# Patient Record
Sex: FEMALE | Race: WHITE | Employment: FULL TIME | URBAN - METROPOLITAN AREA
[De-identification: names, ages, dates, MRNs, and addresses within clinical notes are randomized per-mention and may not be internally consistent; named-entity substitution may affect disease eponyms.]

---

## 2017-07-26 ENCOUNTER — TELEPHONE (OUTPATIENT)
Dept: FAMILY MEDICINE CLINIC | Age: 52
End: 2017-07-26

## 2017-08-02 ENCOUNTER — TELEPHONE (OUTPATIENT)
Dept: FAMILY MEDICINE CLINIC | Age: 52
End: 2017-08-02

## 2017-09-05 ENCOUNTER — OFFICE VISIT (OUTPATIENT)
Dept: FAMILY MEDICINE CLINIC | Age: 52
End: 2017-09-05
Payer: COMMERCIAL

## 2017-09-05 VITALS
WEIGHT: 159.2 LBS | SYSTOLIC BLOOD PRESSURE: 116 MMHG | RESPIRATION RATE: 16 BRPM | BODY MASS INDEX: 24.13 KG/M2 | DIASTOLIC BLOOD PRESSURE: 72 MMHG | TEMPERATURE: 97.1 F | HEIGHT: 68 IN | HEART RATE: 62 BPM

## 2017-09-05 DIAGNOSIS — Z00.00 ENCOUNTER FOR ANNUAL HEALTH EXAMINATION: Primary | ICD-10-CM

## 2017-09-05 LAB
ALBUMIN SERPL-MCNC: 4.6 G/DL (ref 3.5–5.1)
ALP BLD-CCNC: 66 U/L (ref 38–126)
ALT SERPL-CCNC: 25 U/L (ref 11–66)
AMYLASE: 67 U/L (ref 20–104)
ANION GAP SERPL CALCULATED.3IONS-SCNC: 12 MEQ/L (ref 8–16)
AST SERPL-CCNC: 25 U/L (ref 5–40)
AVERAGE GLUCOSE: 105 MG/DL (ref 70–126)
BASOPHILS # BLD: 0.8 %
BASOPHILS ABSOLUTE: 0 THOU/MM3 (ref 0–0.1)
BILIRUB SERPL-MCNC: 0.6 MG/DL (ref 0.3–1.2)
BILIRUBIN DIRECT: < 0.2 MG/DL (ref 0–0.3)
BUN BLDV-MCNC: 16 MG/DL (ref 7–22)
CALCIUM SERPL-MCNC: 10.2 MG/DL (ref 8.5–10.5)
CHLORIDE BLD-SCNC: 100 MEQ/L (ref 98–111)
CHOLESTEROL, TOTAL: 210 MG/DL (ref 100–199)
CO2: 30 MEQ/L (ref 23–33)
CREAT SERPL-MCNC: 0.7 MG/DL (ref 0.4–1.2)
EOSINOPHIL # BLD: 3.9 %
EOSINOPHILS ABSOLUTE: 0.2 THOU/MM3 (ref 0–0.4)
ESTRADIOL LEVEL: 6.2 PG/ML
FOLLICLE STIMULATING HORMONE: 109.1 MIU/ML (ref 16–160)
GFR SERPL CREATININE-BSD FRML MDRD: 87 ML/MIN/1.73M2
GLUCOSE BLD-MCNC: 101 MG/DL (ref 70–108)
HBA1C MFR BLD: 5.5 % (ref 4.4–6.4)
HCT VFR BLD CALC: 45.5 % (ref 37–47)
HDLC SERPL-MCNC: 74 MG/DL
HEMOGLOBIN: 15.4 GM/DL (ref 12–16)
HEPATITIS C ANTIBODY: NEGATIVE
LDL CHOLESTEROL CALCULATED: 122 MG/DL
LIPASE: 43.8 U/L (ref 5.6–51.3)
LUTEINIZING HORMONE: 57.1 MIU/ML (ref 3.3–70.6)
LYMPHOCYTES # BLD: 43.5 %
LYMPHOCYTES ABSOLUTE: 2.3 THOU/MM3 (ref 1–4.8)
MAGNESIUM: 2.1 MG/DL (ref 1.6–2.4)
MCH RBC QN AUTO: 31.3 PG (ref 27–31)
MCHC RBC AUTO-ENTMCNC: 33.8 GM/DL (ref 33–37)
MCV RBC AUTO: 92.6 FL (ref 81–99)
MONOCYTES # BLD: 6.3 %
MONOCYTES ABSOLUTE: 0.3 THOU/MM3 (ref 0.4–1.3)
NUCLEATED RED BLOOD CELLS: 0 /100 WBC
PDW BLD-RTO: 13.6 % (ref 11.5–14.5)
PLATELET # BLD: 231 THOU/MM3 (ref 130–400)
PMV BLD AUTO: 8.1 MCM (ref 7.4–10.4)
POTASSIUM SERPL-SCNC: 4 MEQ/L (ref 3.5–5.2)
PROGESTERONE LEVEL: < 0.05 NG/ML
PTH INTACT: 26.2 PG/ML (ref 15–65)
RBC # BLD: 4.92 MILL/MM3 (ref 4.2–5.4)
RBC # BLD: NORMAL 10*6/UL
RHEUMATOID FACTOR: < 10 IU/ML (ref 0–13)
SEDIMENTATION RATE, ERYTHROCYTE: 7 MM/HR (ref 0–20)
SEG NEUTROPHILS: 45.5 %
SEGMENTED NEUTROPHILS ABSOLUTE COUNT: 2.5 THOU/MM3 (ref 1.8–7.7)
SODIUM BLD-SCNC: 142 MEQ/L (ref 135–145)
T3 TOTAL: 110 NG/DL (ref 72–181)
TOTAL PROTEIN: 6.8 G/DL (ref 6.1–8)
TRIGL SERPL-MCNC: 68 MG/DL (ref 0–199)
TSH SERPL DL<=0.05 MIU/L-ACNC: 1.29 UIU/ML (ref 0.4–4.2)
URIC ACID: 6 MG/DL (ref 2.4–5.7)
VITAMIN D 25-HYDROXY: 78 NG/ML (ref 30–100)
WBC # BLD: 5.4 THOU/MM3 (ref 4.8–10.8)

## 2017-09-05 PROCEDURE — 36415 COLL VENOUS BLD VENIPUNCTURE: CPT | Performed by: FAMILY MEDICINE

## 2017-09-05 PROCEDURE — 99396 PREV VISIT EST AGE 40-64: CPT | Performed by: FAMILY MEDICINE

## 2017-09-05 RX ORDER — CHLORHEXIDINE/GLYCERIN/HE-CELL
3 JELLY (GRAM) TOPICAL 4 TIMES DAILY
COMMUNITY

## 2017-09-05 ASSESSMENT — ENCOUNTER SYMPTOMS
EYES NEGATIVE: 1
ALLERGIC/IMMUNOLOGIC NEGATIVE: 1
GASTROINTESTINAL NEGATIVE: 1
RESPIRATORY NEGATIVE: 1

## 2017-09-05 ASSESSMENT — PATIENT HEALTH QUESTIONNAIRE - PHQ9
SUM OF ALL RESPONSES TO PHQ9 QUESTIONS 1 & 2: 0
SUM OF ALL RESPONSES TO PHQ QUESTIONS 1-9: 0
1. LITTLE INTEREST OR PLEASURE IN DOING THINGS: 0
2. FEELING DOWN, DEPRESSED OR HOPELESS: 0

## 2017-09-06 LAB — THYROXINE (T4): 5.7 UG/DL (ref 4.5–12)

## 2017-09-07 LAB
ANA SCREEN: DETECTED
C-REACTIVE PROTEIN, HIGH SENSITIVITY: 0.4 MG/L
DHEAS (DHEA SULFATE): 214 UG/DL (ref 26–200)
HOMOCYSTEINE, TOTAL: 8 UMOL/L
TESTOSTERONE FREE: NORMAL
THYROID PEROXIDASE ANTIBODY: 1.4 IU/ML (ref 0–9)

## 2017-09-08 LAB
ANA SCREEN, REFLEXED: < 1
GLIADIN PEPTIDE IGG, IGA: NORMAL

## 2017-09-09 LAB
DHEA UNCONJUGATED: 1.91 NG/ML (ref 0.63–4.7)
TESTOSTERONE, FREE W SHGB, FEMALES/CHILDREN: NORMAL

## 2018-09-17 ENCOUNTER — TELEPHONE (OUTPATIENT)
Dept: FAMILY MEDICINE CLINIC | Age: 53
End: 2018-09-17

## 2018-11-12 ENCOUNTER — OFFICE VISIT (OUTPATIENT)
Dept: FAMILY MEDICINE CLINIC | Age: 53
End: 2018-11-12
Payer: COMMERCIAL

## 2018-11-12 VITALS
TEMPERATURE: 98.4 F | BODY MASS INDEX: 25.16 KG/M2 | HEART RATE: 54 BPM | DIASTOLIC BLOOD PRESSURE: 68 MMHG | SYSTOLIC BLOOD PRESSURE: 128 MMHG | WEIGHT: 166 LBS | RESPIRATION RATE: 10 BRPM | OXYGEN SATURATION: 99 % | HEIGHT: 68 IN

## 2018-11-12 DIAGNOSIS — R53.81 MALAISE AND FATIGUE: ICD-10-CM

## 2018-11-12 DIAGNOSIS — R79.83 HOMOCYSTEINEMIA: ICD-10-CM

## 2018-11-12 DIAGNOSIS — R89.4 NONSPECIFIC IMMUNOLOGICAL FINDINGS: ICD-10-CM

## 2018-11-12 DIAGNOSIS — K21.00 GASTROESOPHAGEAL REFLUX DISEASE WITH ESOPHAGITIS: ICD-10-CM

## 2018-11-12 DIAGNOSIS — L57.0 ACTINIC KERATOSIS: ICD-10-CM

## 2018-11-12 DIAGNOSIS — E78.9 DISORDER OF LIPID METABOLISM: ICD-10-CM

## 2018-11-12 DIAGNOSIS — D72.821 MONOCYTOSIS (SYMPTOMATIC): ICD-10-CM

## 2018-11-12 DIAGNOSIS — R53.83 MALAISE AND FATIGUE: ICD-10-CM

## 2018-11-12 DIAGNOSIS — E78.1 HIGH TRIGLYCERIDES: Primary | ICD-10-CM

## 2018-11-12 DIAGNOSIS — R79.9 ABNORMAL BLOOD CHEMISTRY: ICD-10-CM

## 2018-11-12 PROCEDURE — 99214 OFFICE O/P EST MOD 30 MIN: CPT | Performed by: FAMILY MEDICINE

## 2018-11-12 RX ORDER — PEDI MULTIVIT NO.12 W-FLUORIDE 0.25 MG
2 TABLET,CHEWABLE ORAL 2 TIMES DAILY
Qty: 180 CAPSULE | Refills: 3 | Status: SHIPPED | OUTPATIENT
Start: 2018-11-12 | End: 2018-11-13 | Stop reason: SDUPTHER

## 2018-11-12 RX ORDER — ASCORBIC ACID 500 MG
1000 TABLET ORAL 4 TIMES DAILY
COMMUNITY

## 2018-11-12 ASSESSMENT — PATIENT HEALTH QUESTIONNAIRE - PHQ9
SUM OF ALL RESPONSES TO PHQ QUESTIONS 1-9: 0
SUM OF ALL RESPONSES TO PHQ QUESTIONS 1-9: 0
1. LITTLE INTEREST OR PLEASURE IN DOING THINGS: 0
2. FEELING DOWN, DEPRESSED OR HOPELESS: 0
SUM OF ALL RESPONSES TO PHQ9 QUESTIONS 1 & 2: 0

## 2018-11-12 NOTE — PROGRESS NOTES
2018    Betzaida Client Given (:  1965) is a 46 y.o. female, here for evaluation of the following medical concerns:    TRISH Omalley is a 46 y.o. White female. Gerardo Grijalva  presents to the [facility] today for   Chief Complaint   Patient presents with    Follow-up     WEE PROTOCAL    Congestion    Oral Swelling     root canal treatment   ,  and;   1. High triglycerides    2. Actinic keratosis    3. Gastroesophageal reflux disease with esophagitis    4. Nonspecific immunological findings    5. Disorder of lipid metabolism    6. Monocytosis (symptomatic)    7. Abnormal blood chemistry    8. Malaise and fatigue    9. Homocysteinemia ([facility] [address]      I have reviewed Betzaida Edwards Given medical, surgical and other pertinent history in detail, and have updated medication and allergy information in the computerized patient record. Clinical Care Team:     -Referring Provider for today's consult: Self Referred  -Primary Care Provider: No primary care provider on file. Medical/Surgical History:   She  has a past medical history of AK (actinic keratosis); Fibromyalgia; Folliculitis; Gastritis; GERD (gastroesophageal reflux disease); IBS (irritable bowel syndrome); IgG Gliadin antibody positive; Lipids abnormal; Monocytosis; MVP (mitral valve prolapse); Psoriasis; and Thyroid disease. Her  has a past surgical history that includes Cholecystectomy and Breast lumpectomy. Family/Social History:     Her family history includes Alcohol Abuse in her brother and father; Arthritis in her father and sister; Coronary Art Dis in her father; Hypertension in her brother and father; Prostate Cancer in her father; Thyroid Disease in her sister. She  reports that she quit smoking about 13 years ago. She has a 2.50 pack-year smoking history. She has never used smokeless tobacco. She reports that she does not drink alcohol or use drugs.     Medications/Allergies/Immunizations:     Her current capsules by mouth 4 times daily (before meals and nightly)  Yes Historical Provider, MD   Magnesium Oxide 250 MG TABS Take 3 tablets by mouth 4 times daily (before meals and nightly)  Yes Historical Provider, MD   Multiple Vitamins-Minerals (CENTRUM PO) Take  by mouth. take 1 Tab by mouth daily. Yes Historical Provider, MD   tazarotene (AVAGE) 0.1 % cream Apply  topically. 1 Application by Topical route. use thin film Yes Historical Provider, MD        Allergies   Allergen Reactions    Azithromycin     Cephalexin     Levofloxacin     Parabens     Penicillins     Sulfa Antibiotics     Tetracycline     Wheat Bran        Past Medical History:   Diagnosis Date    AK (actinic keratosis)     Fibromyalgia     Folliculitis     Gastritis     GERD (gastroesophageal reflux disease)     IBS (irritable bowel syndrome)     IgG Gliadin antibody positive     Lipids abnormal     Monocytosis     MVP (mitral valve prolapse)     Psoriasis     Thyroid disease        Past Surgical History:   Procedure Laterality Date    BREAST LUMPECTOMY      CHOLECYSTECTOMY         Social History     Social History    Marital status: Single     Spouse name: N/A    Number of children: N/A    Years of education: N/A     Occupational History    Not on file.      Social History Main Topics    Smoking status: Former Smoker     Packs/day: 0.25     Years: 10.00     Quit date: 1/1/2005    Smokeless tobacco: Never Used    Alcohol use No    Drug use: No    Sexual activity: No     Other Topics Concern    Not on file     Social History Narrative    No narrative on file        Family History   Problem Relation Age of Onset    Alcohol Abuse Father     Arthritis Father     Coronary Art Dis Father     Hypertension Father     Prostate Cancer Father     Thyroid Disease Sister     Arthritis Sister     Alcohol Abuse Brother     Hypertension Brother        Vitals:    11/12/18 1125   BP: 128/68   Site: Right Upper Arm   Position: Sitting   Cuff Size: Medium Adult   Pulse: 54   Resp: 10   Temp: 98.4 °F (36.9 °C)   TempSrc: Oral   SpO2: 99%   Weight: 166 lb (75.3 kg)   Height: 5' 8.25\" (1.734 m)     Estimated body mass index is 25.06 kg/m² as calculated from the following:    Height as of this encounter: 5' 8.25\" (1.734 m). Weight as of this encounter: 166 lb (75.3 kg). Physical Exam   Constitutional: She is oriented to person, place, and time. She appears well-developed and well-nourished. HENT:   Head: Normocephalic. Pulmonary/Chest: Effort normal.   Neurological: She is alert and oriented to person, place, and time. Psychiatric: She has a normal mood and affect. Thought content normal.   Nursing note and vitals reviewed. ASSESSMENT/PLAN:      No Follow-up on file. Laboratory Data:   Lab results were searched in Care Everywhere and/or those brought by the pateint were reviewed today with Maine Mackey and she has a copy of their most recent labs to take home with them as noted below;       Imaging Data:   Imaging Data:       Assessment & Plan:       Impression:  1. High triglycerides    2. Actinic keratosis    3. Gastroesophageal reflux disease with esophagitis    4. Nonspecific immunological findings    5. Disorder of lipid metabolism    6. Monocytosis (symptomatic)    7. Abnormal blood chemistry    8. Malaise and fatigue    9. Homocysteinemia ([facility] [address]      Assessment and Plan:  After reviewing the patients chief complaints, reviewing their lab findings in great detail (with the patient and those accompanying them) which correlate to their chief complaints, symptoms, and or medical conditions; suggestions were made relating to changes in diet and or supplements which may improve the complaints and which will be reflected in their future lab findings;   Chief Complaint   Patient presents with    Follow-up     WEE PROTOCAL    Congestion    Oral Swelling     root canal treatment   ;    Plans for the next visits:  - Abnormal and insulin-enhancing properties in fat cells are responsible for enhanced glucose uptake, inhibiting hepatic HMG-CoA reductase and lowers lipids. www.jacn. org/content/20/4/327.full     But cinnamon with additives such as Cinnamon Extract are not effective as insulin mimetics. https://www.barclay.net/     Nutrients for Start up from [employer] or [employer] for ease to get started now ;  Junior Holt has some useable products;  - Triple Strength Fish Oil, enteric coated  - Vit D 3 5000 IU gel caps  - Iron ferrous sulfat 325 mg tabs  - Centrum Silver look-a-like for most patients not needing iron, or  - Centrum plain look-a-like if need iron    Local pharmacy chains such as [facility], [address] [place] [address], [address] [place] [address], [facility].  [facility];  - Triple Strength Fish Oil (enteric coated if available) or    If not enteric coated, can take from freezer for less burps  - B-50 or B-100 time released balanced B complex tabs not containing Vit C in tablet  - Cinnamon bark 500 mg (with Chromium but not extracts)   some brands list 1000 mg / serving of 2 500 mg capsules,    some brands have 1000 mg caps with the chromium but capsule size is huge  - Calcium carbonate/citrate, magnesium oxide/citrate, Vit D 3  as 3-4 tabs/caps/serving     Some Local Brands may contain Zinc which is acceptable for the first bottle or two  - Magnesium oxide 250 mg tabs for those having < 2 bowel movements daily  - Magnesium citrate TABLETS  or Slow Mag, or magnesium gluconate if having > 2 bowel movement/day  - Centrum Silver or look-a-like for most patients, Centrum plain or look-a-like with iron  - Vitamin D-3 comes as 1,000 IU or 2,000 IU or 5,000 IU gel caps or Liquid drops      Some brands containing or derived from soy oil or corn oil are OK if not allergic to soy  - Elemental Iron 65 mg tabs at bedtime is available over the counter if need more iron     Usually turns bowel movements grey, green or black but not a concern  - Apricot

## 2018-11-12 NOTE — PATIENT INSTRUCTIONS
1. You may receive a survey about your visit with us today. The feedback from our patients helps us identify what is working well and where the service to all patients can be enhanced. Thank you! 2. Please bring in ALL medications BOTTLES, including inhalers, herbal supplements, over the counter, prescribed & non-prescribed medicine. The office would like actual medication bottles and a list.  3. Please note our OFFICE POLICIES:  a. Prior to getting your labs drawn, please check with your insurance company for benefits and eligibility of lab services. Often, insurance companies cover certain tests for preventative visits only. It is patient's responsibility to see what is covered. b. We are unable to change a diagnosis after the test has been performed. c. Lab orders will not be re-printed. Please hold onto your original lab orders and take them to your lab to be completed. d. If you no show your schedule appointment three times, you be dismissed from this practice. e. Delbra Salamanca must be completed 24 hours prior to your schedule appointment. 4. If the list below has been completed, PLEASE FAX RECORDS TO OUR OFFICE @ [phone]. Once the records have been received we will update your records at our office. Health Maintenance Due   Topic Date Due    HIV screen  05/19/1979    DTaP/Tdap/Td vaccine (1 - Tdap) 05/19/1983    Shingles Vaccine (1 of 2 - 2 Dose Series) 05/19/2014    Cervical cancer screen  07/04/2017    Flu vaccine (1) 09/01/2018       Schedule your 2018 flu vaccine with Dr. Cayla Delvalle call [phone]!   [facility], for anyone 9 years or older   [address]   Every Monday   8:00am-11:00am and 1:00pm-3:00pm

## 2018-11-13 ENCOUNTER — TELEPHONE (OUTPATIENT)
Dept: FAMILY MEDICINE CLINIC | Age: 53
End: 2018-11-13

## 2018-11-13 RX ORDER — PEDI MULTIVIT NO.12 W-FLUORIDE 0.25 MG
2 TABLET,CHEWABLE ORAL 2 TIMES DAILY
Qty: 180 CAPSULE | Refills: 3 | Status: SHIPPED | OUTPATIENT
Start: 2018-11-13 | End: 2019-06-14

## 2018-11-27 ENCOUNTER — TELEPHONE (OUTPATIENT)
Dept: FAMILY MEDICINE CLINIC | Age: 53
End: 2018-11-27

## 2019-06-10 ENCOUNTER — TELEPHONE (OUTPATIENT)
Dept: FAMILY MEDICINE CLINIC | Age: 54
End: 2019-06-10

## 2019-06-10 NOTE — TELEPHONE ENCOUNTER
Fax sent via Epic. Patient called stating lab has an issues with the expected date \"Please call us if any issues with expected date. Labs are good for 1 year after ordering date. \"

## 2019-06-10 NOTE — TELEPHONE ENCOUNTER
Pt. Is calling needing a new lab order called into [facility] in [place], [place] her other one is too old for them to use. Please advise pt.  At [phone]

## 2019-06-10 NOTE — TELEPHONE ENCOUNTER
Please fax , labwork order to [address], [phone], [facility] said was , getting bloodwork drawn early lizette zhou.

## 2019-06-13 LAB
AVERAGE GLUCOSE: NORMAL
BUN BLDV-MCNC: NORMAL MG/DL
CALCIUM SERPL-MCNC: NORMAL MG/DL
CHLORIDE BLD-SCNC: NORMAL MMOL/L
CHOLESTEROL, TOTAL: 198 MG/DL
CHOLESTEROL/HDL RATIO: NORMAL
CO2: NORMAL MMOL/L
CREAT SERPL-MCNC: 1.14 MG/DL
GFR CALCULATED: NORMAL
GLUCOSE BLD-MCNC: NORMAL MG/DL
HBA1C MFR BLD: 5.5 %
HDLC SERPL-MCNC: 63 MG/DL (ref 35–70)
LDL CHOLESTEROL CALCULATED: 127 MG/DL (ref 0–160)
POTASSIUM SERPL-SCNC: 4.3 MMOL/L
SODIUM BLD-SCNC: NORMAL MMOL/L
TRIGL SERPL-MCNC: 42 MG/DL
VLDLC SERPL CALC-MCNC: 8 MG/DL

## 2019-06-14 ENCOUNTER — OFFICE VISIT (OUTPATIENT)
Dept: FAMILY MEDICINE CLINIC | Age: 54
End: 2019-06-14
Payer: COMMERCIAL

## 2019-06-14 VITALS
TEMPERATURE: 97.7 F | HEART RATE: 64 BPM | HEIGHT: 68 IN | RESPIRATION RATE: 10 BRPM | OXYGEN SATURATION: 99 % | WEIGHT: 166.2 LBS | BODY MASS INDEX: 25.19 KG/M2

## 2019-06-14 DIAGNOSIS — R53.81 MALAISE AND FATIGUE: Primary | ICD-10-CM

## 2019-06-14 DIAGNOSIS — E78.1 HIGH TRIGLYCERIDES: ICD-10-CM

## 2019-06-14 DIAGNOSIS — R06.83 SNORES: ICD-10-CM

## 2019-06-14 DIAGNOSIS — R79.83 HOMOCYSTEINEMIA: ICD-10-CM

## 2019-06-14 DIAGNOSIS — R79.9 ABNORMAL BLOOD CHEMISTRY: ICD-10-CM

## 2019-06-14 DIAGNOSIS — R53.83 MALAISE AND FATIGUE: Primary | ICD-10-CM

## 2019-06-14 DIAGNOSIS — R89.4 NONSPECIFIC IMMUNOLOGICAL FINDINGS: ICD-10-CM

## 2019-06-14 DIAGNOSIS — D72.821 MONOCYTOSIS (SYMPTOMATIC): ICD-10-CM

## 2019-06-14 DIAGNOSIS — E78.9 DISORDER OF LIPID METABOLISM: ICD-10-CM

## 2019-06-14 DIAGNOSIS — R03.0 ELEVATED BP WITHOUT DIAGNOSIS OF HYPERTENSION: ICD-10-CM

## 2019-06-14 DIAGNOSIS — K21.00 GASTROESOPHAGEAL REFLUX DISEASE WITH ESOPHAGITIS: ICD-10-CM

## 2019-06-14 PROCEDURE — 99214 OFFICE O/P EST MOD 30 MIN: CPT | Performed by: FAMILY MEDICINE

## 2019-06-14 RX ORDER — MOXIFLOXACIN HYDROCHLORIDE 400 MG/1
1 TABLET ORAL DAILY
COMMUNITY
Start: 2019-06-08 | End: 2020-02-28 | Stop reason: ALTCHOICE

## 2019-06-14 ASSESSMENT — PATIENT HEALTH QUESTIONNAIRE - PHQ9
SUM OF ALL RESPONSES TO PHQ QUESTIONS 1-9: 0
SUM OF ALL RESPONSES TO PHQ9 QUESTIONS 1 & 2: 0
1. LITTLE INTEREST OR PLEASURE IN DOING THINGS: 0
SUM OF ALL RESPONSES TO PHQ QUESTIONS 1-9: 0
2. FEELING DOWN, DEPRESSED OR HOPELESS: 0

## 2019-06-14 NOTE — PATIENT INSTRUCTIONS
Thank you   1. Thank you for trusting us with your healthcare needs. You may receive a survey regarding today's visit. It would help us out if you would take a few moments to provide your feedback. We value your input. 2. Please bring in ALL medications BOTTLES, including inhalers, herbal supplements, over the counter, prescribed & non-prescribed medicine. The office would like actual medication bottles and a list.   3. Please note our OFFICE POLICIES:   a. Prior to getting your labs drawn, please check with your insurance company for benefits and eligibility of lab services. Often, insurance companies cover certain tests for preventative visits only. It is patient's responsibility to see what is covered. b. We are unable to change a diagnosis after the test has been performed. c. Lab orders will not be re-printed. Please hold onto your original lab orders and take them to your lab to be completed. d. If you no show your scheduled appointment three times, you will be dismissed from this practice. e. Horacio Villanuevater must be completed 24 hours prior to your schedule appointment. 4. If the list below has been completed, PLEASE FAX RECORDS TO OUR OFFICE @ [phone].  Once the records have been received we will update your records at our office:  Health Maintenance Due   Topic Date Due    HIV screen  05/19/1979    Shingles Vaccine (1 of 2) 05/19/2014    Cervical cancer screen  07/04/2017

## 2019-06-14 NOTE — LETTER
[address] [place] [address],[address] [address] Luanne Saavedra  Phone: [phone]  Fax: [phone]    Kris Pal MD        June 14, 2019    [address] [place] [id number]      Dear Hermon Callow:    Below are the Radha Narayanan suggestions based on these results from your recent lab visit. Always check with each of your health advisors / doctors before making any changes. As we do, you can research any issues or concerns at www.nih.gov     Please send questions by My Chart or call for a lab review appointment with me in the office at which time I can also write future lab orders based on symptoms, conditions and these lab results. If you do not have [facility] access call for an appointment in the next week so we can update your supplement list to correctly include these suggestions    Bring to each visit, all you bottles, food drink symptom log, & outside test results to be integrated into the Radha Narayanan protocol being developed just for you; and your questions. Come back in if you would like more details than this letter's explanation for why you can benefit from adding or changing each item. Call for an appointment or Video Visit (if do not use [employer]) so you are clear on the plan    Life changes are not easy, but after 55,000 visits, I better understand the complexity of altering your symptom generating diet to the more healthful Wee MARLIN-2 based [address] which are gluten, carrageenan, latex free, as well as glycemic controlled. We are fortunate to be able assist you in developing a change plan for Better Health in the next 120 days and beyond. The Choices are always yours. Below are Some Initial  Options to improve your health over the next 120 days and beyond; Since your Mineral Reserves stabilize your entire metabolic system:   To get your 29 PTH to our goal of <15, since yours = > 14 % bone loss; we can adjust the relative levels of Vitamin D3, Calcium and Magnesium by lowering highest one, but where that is not possible, then we raise the lower ones by doing the following; For your 64.2 Vitamin D3 to get in 45-50 range , reduce by 1,000 IU Vit D-3 daily    For your high 10.4 Calcium to get in 9.5 range, reduce Vit D and calcium/day    For your 2.2 Magnesium to get at 2.3-2.4 range, you can add 1 magnesium per day , such as Magtein by Now which is good for the nervous system also    Lipid and Carbohydrate Metabolism, Long-term Memory, Getting to Sleep, Nocturia, etc;    For your 63 HDL cholesterol to get to >60, 1.0 C-reactive Protein to get to 0.00,    Be sure to get in a time-released(TR) B-50 or 1/2 of a time-released B-100 tablet before each meal or at mealtime, up to 3 times / day. Time-released B-50 or B-100 are available at [employer], [employer]oyou[employer]eting or Ex[employer]World @ YouGotListings and [employer]. Note:  use time released B-50 or 1/2 of a B-100 from [employer], [employer], or [employer] (not containing Vitamin C in the tablet)     For your 5.5 A1c to get below 5.8,  42 Triglycerides to get <150, you are good but you can add 500 mg cinnamon cap 4 times a day as in before meals and at bedtime, but can increase up to 12 caps per day if needed for LDL or weight loss along with getting magnesium to 2.3-2.4 levels. It is good to levon cinnamon and other supplements with 2-3 ounces of Half and Half containing no carrageenan taken after supplements, meals and at bedtime. It is also good to start the dose low and increase over time if not used to taking supplements  Chromium may enhance Cinnamon's effects but Cinnamon Extracts do Not Work as well     For your 198 total Cholesterol to get to <200 and your high 127 LDL cholesterol to get to <100;  we need to get your PTH to <15 and see how much that reduces these two cholesterols.   If changing the Calcium, Vit D3, and Magnesium, adding Cinnamon and pharmaceutical fish oil do not clear these then we can consider these other options such as adding a low-dose statin drug, red rice yeast which contains natural pravachol     If these are not effective, we can consider adding or increasing the first phase of the [employer] protocol are effective each effective in lowering the Total Cholesterol and LDL cholesterol;  1) taking 500 mg of L-lysine before meals(mealtimes) and at bedtime, &  2) taking 1000 mg of Vitamin C before meals(mealtimes) and at bedtime, &  3) taking a K2 100 mcg capsule twice a day,  but Do not add K-2 if on warfarin, other blood thinners or if have elevated D-dimer level  (all available at [facility], [employer], [employer], [employer] and many [employer] stores)  Bonus: Lysine taken 4 times a day prevents viruses by blocking their use of arginine    Inflammation Related to Omega 6 Plant oils in diet:   For your 7 % Monocytes to reduce by 1% to get toward the <6% range, and to get your 6 % Eosinophils toward <2% for allergies; your RDW and Platelets in reference range; you can add 1 [facility] pharmaceutical grade fish oil cap(90% good) or 1 [address] [facility]-3 from [employer], (95% good) before meals(mealtimes) and at bedtime   (Do not add Fish Oil if on warfarin, or other blood thinners but remove more offending foods)    Best results come if you can continue to remove seeds, nuts, flax, soy, avocado, hummus, granola, poultry and chips from your diet to reduce the plant oils inflaming your immune system. Search how each of your foods reacts from very good to awful in your body at the MARLIN-2 at http://czqpf1czlhty. com/ Note: humans can not convert flax or khadra to EPA/DHA    Short-term memory, Mental Cloudiness;Dementia related to Homocystinemia,MTHRF;   For your 9.4 Homocysteine,  you can reduce your risk of dementia, strokes, fibromyalgia by using current methylcobalamin(methyl B12) and methyl folate each morning if taking any Life Interest, Brain, Body, Adrenal Hormone Energy Support Levels; For your 367.8 DHEA sulfate, which represents 100 % brain energy, to improve   For your 32.7 Testosterone, which represents 65 % body energy, to improve    For your 352 DHEA, which represents 50 % adrenal reserve, to improve; You can start the dose of DHEA at 5-10 mg early each morning  if OK with all your health advisors/doctors for the brain, body, adrenal and/or hormone support you desire as reflected in DHEA-s, DHEA, testosterone and estradiol levels. See Kähu. com for low dose tabs  If already on DHEA then you can increase by 10 mg each morning if OK with all your health advisors/doctors but be aware of its effect on your tone with others. Thyroid Functions related to Grains, Carrageenan and Gluten; For your 2.270 TSH to get to 1.00, 132 T3 to mid range, 7.4 T4 to mid range, 20 Thyroid Peroxidase(TPO) to get to 0; which are related to 4 Gliadin IGG =  13 % bowel damage from gluten in grains and carrageenan in beer, juices and dairy products , Removing grains, carrageenan and latex foods from the diet improves thyroid gland functioning, bowel health, and several auto-immune tests results as well as preventing grain brain, wheat belly and so many more symptoms / conditions. Auto-Immune related Tests related to Grains, Latex and Plant oil in our diet; For your 7 Sedimentation Rate to get to <10,    For your 10.9 Rheumatoid Factor to get to negative,    For your (-) DUARTE titer to get to negative; You can remove grains, latex-like proteins in foods, and excess plant oil foods such as seeds, nuts, flax, soy, avocado, hummus, granola, poultry    Other Non-Optimal Lab results to review on your next visit;  Uric Acid:   Lab Results   Component Value Date    URICACID 6.0 09/05/2017      D-Dimer: No results found for: DDIMER  CA 19-9: No results found for:   CA 27-29: No results found for: [id number]  CA 15-3: No results found for:  CEA: No results found for: CEA  : No results found for:   IGE: No results found for: IGE  PSA: No results found for: PSA     Elevated Creatinine and low GFR can improve with more water    Reminder;   FREE On-line Classes on Nutritional Principles, Removing Toxic Foods, Celiac/ Gluten / Gliadin, 75 Foods for Better Health, Carrageenan, Natamycin and other toxic food additives have been video taped and are now available 24/7 on line to you at [email or web address] once you activate your FREE login and password. To access these classes, call my staff at [phone] for login and password    Lab Review Visit needed to write future lab orders based on your symptoms then. These are your results received so far, so review, then call to schedule a video lab review visit or come back in to see me if not clear on how to proceed on your correct path and to get your next orders. We will issue your next lab orders based on your lab letter and your issues discussed during such a lab review visit. In keeping with accountable health plans, at that visit you can let me know which of the lab tests you feel you need to provide you with results to indicate how you are doing with your health issues and concerns    In keeping with accountable health plans, check with your insurer to be sure you are covered at the level you want at the lab you use before getting any of the tests done. We recommend that you repeat the above non-optimal test(s) in 4 months if not satisfied with your health at that time, and as discussed at your lab review visit. If you have any questions or concerns, please don't hesitate to call my staff or send by BitTorrent, if do not use BitTorrent, call for an appointment soon.     Sincerely,        Ofelia Mcfadden MD

## 2019-06-14 NOTE — PROGRESS NOTES
[address] [facility]. [facility]  [address] [place] [address] [address] [address]  Dept: [phone]  Dept Fax: [phone]  Loc: [phone]      Timothy Marrero is a 53 y.o. White female. Brendan Barger  presents to the [facility] today for   Chief Complaint   Patient presents with    Follow-up    Discuss Labs    Discuss Medications   ,  and;   1. Malaise and fatigue    2. Snores    3. Elevated BP without diagnosis of hypertension    4. Gastroesophageal reflux disease with esophagitis    5. Nonspecific immunological findings    6. Disorder of lipid metabolism    7. Monocytosis (symptomatic)    8. Abnormal blood chemistry    9. Homocysteinemia ([facility] [address]    10. High triglycerides      I have reviewed Zi Germain medical, surgical and other pertinent history in detail, and have updated medication and allergy information in the computerized patientrecord. Clinical Care Team:     -Referring Provider for today's consult: Self Referred  -Primary Care Provider: Odalys Clemons    Medical/Surgical History:   She  has a past medical history of AK (actinic keratosis), Fibromyalgia, Folliculitis, Gastritis, GERD (gastroesophageal reflux disease), IBS (irritable bowel syndrome), IgG Gliadin antibody positive, Lipids abnormal, Monocytosis, MVP (mitral valve prolapse), Psoriasis, and Thyroid disease. Her  has a past surgical history that includes Cholecystectomy and Breast lumpectomy. Family/Social History:     Her family history includes Alcohol Abuse in her brother and father; Arthritis in her father and sister; Coronary Art Dis in her father; Hypertension in her brother and father; Prostate Cancer in her father; Thyroid Disease in her sister. She  reports that she quit smoking about 14 years ago. She has a 2.50 pack-year smoking history.  She has never used smokeless tobacco. She reports that she does not drink alcohol or use drugs.    Medications/Allergies/Immunizations:     Her current medication(s) include   Current Outpatient Medications:     moxifloxacin (AVELOX) 400 MG tablet, Take 1 tablet by mouth daily, Disp: , Rfl:     NONFORMULARY, Take 1 tablet by mouth 4 times daily (before meals and nightly) Magtein by Now, magnesium at [facility] or [facility], Disp: , Rfl:     Menaquinone-7 (VITAMIN K2 PO), Take 1 capsule by mouth 2 times daily, Disp: , Rfl:     vitamin C (ASCORBIC ACID) 500 MG tablet, Take 1,000 mg by mouth 4 times daily, Disp: , Rfl:     Lysine 500 MG CAPS, Take 1 capsule by mouth 4 times daily, Disp: , Rfl:     Omega-3 Fatty Acids (CVS FISH OIL) 1000 MG CAPS, Take 3 capsules by mouth 4 times daily CVS instead of Josr Fish Oil, Disp: , Rfl:     Specialty Vitamins Products (MAGNESIUM, AMINO ACID CHELATE,) 133 MG tablet, Take 1 tablet by mouth 4 times daily , Disp: , Rfl:     DHEA 25 MG TABS, Take 50 mg by mouth every morning (before breakfast) , Disp: , Rfl:     B Complex-Folic Acid (V-406 BALANCED TR PO),  Take 1 tablet by mouth 3 times daily (before meals) , Disp: , Rfl:     Calcium 600-200 MG-UNIT TABS, Take 1 tablet by mouth 3 times daily. , Disp: , Rfl:     Calcium-Magnesium-Vitamin D 400-166.7-133.3 MG-MG-UNIT TABS, Take 1 tablet by mouth 2 times daily (before meals) , Disp: , Rfl:     Vitamin D (CHOLECALCIFEROL) 1000 UNITS CAPS capsule, Take 4,000 Units by mouth daily Skip Sundays, Disp: , Rfl:     Cinnamon 500 MG CAPS,  Take 3 capsules by mouth 4 times daily (before meals and nightly) , Disp: , Rfl:     Magnesium Oxide 250 MG TABS, Take 2 tablets by mouth 4 times daily (before meals and nightly) , Disp: , Rfl:     Multiple Vitamins-Minerals (CENTRUM PO), Take  by mouth. take 1 Tab by mouth daily. , Disp: , Rfl:     tazarotene (AVAGE) 0.1 % cream, Apply  topically. 1 Application by Topical route.  use thin film, Disp: , Rfl:   Allergies: Latex; Azithromycin; Cephalexin; Levofloxacin; Parabens; Penicillins; Sulfa antibiotics; Tetracycline; and Wheat bran,  Immunizations:   Immunization History   Administered Date(s) Administered    Tdap (Boostrix, Adacel) 09/25/2016        History of PresentIllness: Diana's had concerns including Follow-up; Discuss Labs; and Discuss Medications. Arlene Alexander  presents to the [address] today for;   Chief Complaint   Patient presents with    Follow-up    Discuss Labs    Discuss Medications   , ,  abnormal labs follow up and these conditions as she  Is looking today for:     1. Malaise and fatigue    2. Snores    3. Elevated BP without diagnosis of hypertension    4. Gastroesophageal reflux disease with esophagitis    5. Nonspecific immunological findings    6. Disorder of lipid metabolism    7. Monocytosis (symptomatic)    8. Abnormal blood chemistry    9. Homocysteinemia ([address]    10. High triglycerides      HPI    Subjective:     Review of Systems   Musculoskeletal: Positive for arthralgias, gait problem and joint swelling. Psychiatric/Behavioral: Positive for sleep disturbance. All other systems reviewed and are negative. Objective:     Pulse 64   Temp 97.7 °F (36.5 °C) (Oral)   Resp 10   Ht 5' 8.27\" (1.734 m)   Wt 166 lb 3.2 oz (75.4 kg)   SpO2 99%   BMI 25.07 kg/m²   Physical Exam   Constitutional: She is oriented to person, place, and time. She appears well-developed and well-nourished. HENT:   Head: Normocephalic. Pulmonary/Chest: Effort normal.   Neurological: She is alert and oriented to person, place, and time. Psychiatric: She has a normal mood and affect. Thought content normal.   Nursing note and vitals reviewed. Laboratory Data:   Lab results were searched in Care Everywhere and/or those brought by the pateint were reviewed today with Tamra Rosario and she has a copy of their most recent labs to take home with them as notedbelow;       Imaging Data:   Imaging Data:       Assessment & Plan:       Impression:  1. Malaise and fatigue    2. Snores    3. Elevated BP without diagnosis of hypertension    4. Gastroesophageal reflux disease with esophagitis    5. Nonspecific immunological findings    6. Disorder of lipid metabolism    7. Monocytosis (symptomatic)    8. Abnormal blood chemistry    9. Homocysteinemia ([facility] [address]    10. High triglycerides      Assessment and Plan:  After reviewing the patients chief complaints, reviewing their labfindings in great detail (with the patient and those accompanying them) which correlate to their chief complaints, symptoms, and or medical conditions; suggestions were made relating to changes in diet and or supplementswhich may improve the complaints and which will be reflected in their future lab findings; Chief Complaint   Patient presents with    Follow-up    Discuss Labs    Discuss Medications   ;    Plans for the next visits:  - Abnormal and non-optimal Labs were ordered today to be repeated in the next 120-365 days to assess changes from adjustments in nutrition and or nutrients. - Patient instructed when having ablood draw to ask the  to divide their lab draws into multiple draws over several days if not feeling good at the time of the lab draw or if either prefers to do several smaller blood draws over several days  -Patient instructed to check with insurer before each lab draw and to to to the lab which the insurer directs them for the most cost effective lab draw with the least patient's cost  - Adriana Tena  will be scheduled subsequentto those results. Ian Woodall will bring in her drink and food log to her next visit    Chronic Problems Addressed on this Visit:                                   1.  Intensity of Service; Uncontrolled items at this visit; Chief Complaint   Patient presents with    Follow-up    Discuss Labs    Discuss Medications   ; Improved items at this visit; Stable items atthis visit;  2.  Patients food and drinks were reviewed with the patient,       - Vicente Beaver will bring food+drink symptom log to next visit for inclusion in their record      - 75 better food list reviewed & given topatient with the omega 6 food list to avoid         - Gluten in corn and oats abstracts sheet reviewed and given to the patient today   3. Greater than 25 minutes were spent face to face on this visit of which >50% was for counseling and coordination of care. Patients food and drinks were reviewed with thepatient,   - they will bring a food drink symptom log to future visits for inclusion in their record    - 75 better food list reviewed & given to patient along with the omega 6 food list to avoid      - Glutenin corn and oats abstracts sheet reviewed and given to the patient today    - 23 Foods containing Latex-like proteins was reviewed and copy to be taken if desired     - Nutrient Supplements list provided and copyto be taken if desired    - [employer] web site offered to patient to review at their convenience by staff with login information    Note:  I have discussed with the patient that with all nutraceuticals, there is often mixed data and emerging research which needs to be monitored; as well as an array of NIHfact sheets on nutrients and supplements. If I have recommended cinnamon at the request of this patient to assist them in control of their blood sugar, triglyceride and or weight issues. I discussed that thepatient's clinical use of cinnamon bark, calcium, magnesium, Vitamin D and pharmaceutical grade CVS #935918 fish oil or triple-strength fish oil, and B-75 two phase time-released B complex by Xavi Kaplan will be for atime-limited trial to determine their individual effectiveness and safety in this patient.   I also referred the patient to the NMCD: Nutrition, Metabolism, and Cardiovascular Diseases (journal) and concerns about long-termuse and hepatotoxicity of cinnamon and other nutrients and suggest they frequently search nih.gov for the latest non-proprietary information on nutriceuticals as well as consider a subscription to [employer] fordetails on reviewed supplements, or at the least review the nutrient files at [address] at [facility], [employer], an insulin mimetic, reduces some High Carbohydrate Dietary Impacts. Methylhydroxychalcone polymers insulin-enhancing properties in fat cells are responsible for enhanced glucose uptake, inhibiting hepatic HMG-CoA reductase and lowers lipids. www.jacn. org/content/20/4/327.full     But cinnamon with additivessuch as Chromium or Cinnamon Extract are not effective as insulin mimetics.  https://www.P[employer] Power.net/     Nutrients for Start up from [employer] or [employer] for ease to get started now ;  Junior Holt has some useable products;  - Triple Strength Fish Oil, enteric coated  - Vit D 3 5000 IU gel caps  - Iron ferrous sulfat 325 mg tabs  - Centrum Silver look-a-like for most patients, or  - Centrum plain look-a-like if need iron    Localpharmacies or chains such as [employer], [employer]green, [employer]-mart, have;  - Triple Strength Fish Oil (enteric coated ifavailable) or    If not enteric coated, can take from freezer for less burps  - B-50 or B-100 time released balanced B complex tabs  - Cinnamon bark 500 mg (without Chromium or extracts)   some brands list 1000 mg / serving of 2 capsules,    some brands have 1000 mg caps with the undesireable chromium / extract  - Calcium carbonate/citrate, magnesium oxide/citrate, Vit D 3  as 3-4 tabs/caps/serving     Some Local Brands may contain Zincwhich is acceptable for the first bottle or two  - Magnesium oxide 250 mg tabs for those having < 2 bowel movements daily  - Magnesium citrate 200 mg if having > 2bowel movement/day  - Centrum Silver or look-a-like for most patients, Centrum plain or look-a-like with iron  - Vitamin D-3 comes as 1,000 IU or 2,000 IU or 5,000 IU gel caps or Liquid drops      Some brands containing or derived from soy oil or corn oil are OK if not allergic to soy  - Elemental Iron 65 mg tabsat bedtime is available over the counter if need more iron     Usually turns bowel movements grey, green or black but not a concern  - Apricot Kernel Oil (by Now) for dry skin sensitive perineal or perianal area skin    Nutrients for ongoing use by Mail order for less expense from www. puritan.com ;  - Triple Strength Fish Oil , 240 Softgels Item [id number]  -B-100 time released balanced B complex Item #[id number]  - Cinnamon bark 500 mg without Chromium or extract Item #023615  - Calcium carbonate 1000 mg, Magnesium oxide 500 mg, Vit D 3  400 IU Item #327485  - Magnesium oxide 500 mg tabs Item #418127 if less than 2 bowel movements daily  - [employer] Seniors Item #423795 for mostpatients, One Daily Item #930000 with iron  - Vit D 3  1,000 Item #024691      2,000 IU Item #433437  5,000 IU Item #739348     Some brands containing orderived from soy oil or corn oil are OK if not allergic to soy    Nutrients for Special Needs by Beth Toro for less expense from www. puritan.com ;  -Elemental Iron 65 mg tabs Item #111235 if need more iron for low iron on labs    Usually turns bowel movements grey, green or black but not a concern  - Time released Niacin 250 mg Item #849901 for cold intolerance, low libido or impotence  - DHEA 50 mg Item #538180 for improving DHEA levels on labs if having Fatigue    If stools too loose substitute for your Magnesium oxide using;   Magnesium citrate 200 mg tabs(NOT liquid) at [employer]aminsGroup[employer].com. [employer]   Magnesium gluconate 550 mgby [place] at [employer]. com or amazon. com  Magnesium chloride foot soaks or body sprays  www.[employer]. [employer]   Magnesium chloride flakes 14.99 Item #: [id number] if Backordered get spray    Food Drink Symptom Log;  I asked this patient to track these items and any other symptoms on their list on a weekly basis to documenttheir progress or February, March: use canned, frozen or dried fruits since lower in latex  April; asparagus, radishes  May; asparagus, broccoli, green onions, greens, peas, radishes,rhubarb  June; asparagus, beets, beans, broccoli, cabbage, cantaloupe, carrots, green onions, greens, lettuce,onions, parsley, peas, radishes, rhubarb, strawberries, watermelons  July; beans, beets, blueberries,broccoli, cabbage, cantaloupe, carrots, cauliflower, celery, cucumbers, eggplant, grapes, green onions, greens, lettuce, onions, parsley, peas, peaches, bell peppers, potatoes, radishes, summer raspberries, squash, sweetcorn, tomatoes, turnips, watermelons  August; apples, beans, beets, blueberries, cabbage, cantaloupe, carrots,cauliflower, celery, cucumbers, eggplant, grapes, green onions, greens, lettuce, onions, parsley, peas, peaches, pears, bell peppers, potatoes, radishes, squash, sweet corn, tomatoes, turnips, watermelons  September; apples, beans, beets, blueberries, cabbage, cantaloupe, carrots, cauliflower, celery, cucumbers, eggplant, grapes,green onions, greens, lettuce, onions, parsley, peas, peaches, pears, bell peppers, plums, potatoes, pumpkins, radishes, fall red raspberries, squash, sweet corn, tomatoes, turnips, watermelons  October; apples, beets, broccoli, cabbage, carrots, cauliflower, celery, green onions, greens, lettuce, parsley, peas, pears, potatoes,pumpkins, radishes, fall red raspberries, squash, turnips  November; broccoli, cabbage, carrots, parsley,pears, peas  December: use canned, frozen ordried fruits since lower in latex    Upto half of latex-sensitive patients show allergic reactions to fruits (avocados, bananas, kiwifruits, papayas, peaches),   Annals of Allergy, 1994. These plants contain the same proteins that are allergens in latex. People with fruit allergies should warn physicians beforeundergoing procedures which may cause anaphylactic reaction if in contact with latex gloves.   Some of the common foods with defined cross-reactivity to latexare avocado, banana, kiwi, chestnut, raw potato, tomato,stone fruits (e.g., peach, cherry), hazelnut, melons, celery, carrot, apple, pear, papaya, and almond. Foods with less well-defined cross-reactivity to latex are peanuts, peppers, citrus fruits, coconut, pineapple, duane,fig, passion fruit, Ugli fruit, and grape    This fruit/latex cross-reactivity is worsened by ethylene, a gas used to hasten commercial ripening. In nature, plants produce low levels of the hormone ethylene, which regulates germination, flowering, and ripening. Forced ripening by high ethyleneconcentrations, plants produce allergenic wound-repair proteins, which are similar to wound-repair proteins made during the tapping of rubber trees. Sensitive individualswho ingest the fruit get a higher dose and worse reaction. Some people may even first become sensitized to latex through fruit. Can food processing increase theconcentrations of allergenic proteins? Latex-sensitized children (and adults) in [place] often experience allergic reactions after eating bananas ripenedartificially with ethylene. In the [place], food distribution centers treat unripe bananas and other produce with ethylene to ripen; not commonly done in [facility] since fruit is tree-ripened there. Does treatmentof food with ethylene induce banana proteins that cross-react with latex? (Mehrdad et al.    References:   Latex in Foods Allergy, http://ehp.niehs.nih.gov/members/2003/5811/5811.html    Search web for \" Whats in Season \" for whereyou live or are at the time you food shop  www.nutritioncouncil.org/pdf/healthy/SeasonalProduce. pdf ,   Management of Latex, http://medicalcenter. [employer].edu/  search for latex

## 2020-01-13 ENCOUNTER — TELEPHONE (OUTPATIENT)
Dept: FAMILY MEDICINE CLINIC | Age: 55
End: 2020-01-13

## 2020-02-26 LAB
CHOLESTEROL, TOTAL: 167 MG/DL
CHOLESTEROL/HDL RATIO: NORMAL
HDLC SERPL-MCNC: 65 MG/DL (ref 35–70)
LDL CHOLESTEROL CALCULATED: 95 MG/DL (ref 0–160)
TRIGL SERPL-MCNC: 35 MG/DL
VLDLC SERPL CALC-MCNC: 7 MG/DL

## 2020-02-28 ENCOUNTER — OFFICE VISIT (OUTPATIENT)
Dept: FAMILY MEDICINE CLINIC | Age: 55
End: 2020-02-28
Payer: COMMERCIAL

## 2020-02-28 VITALS
BODY MASS INDEX: 24.43 KG/M2 | SYSTOLIC BLOOD PRESSURE: 128 MMHG | OXYGEN SATURATION: 99 % | RESPIRATION RATE: 10 BRPM | WEIGHT: 161.2 LBS | HEIGHT: 68 IN | TEMPERATURE: 97.4 F | DIASTOLIC BLOOD PRESSURE: 84 MMHG | HEART RATE: 60 BPM

## 2020-02-28 PROCEDURE — G8484 FLU IMMUNIZE NO ADMIN: HCPCS | Performed by: FAMILY MEDICINE

## 2020-02-28 PROCEDURE — G8427 DOCREV CUR MEDS BY ELIG CLIN: HCPCS | Performed by: FAMILY MEDICINE

## 2020-02-28 PROCEDURE — 99214 OFFICE O/P EST MOD 30 MIN: CPT | Performed by: FAMILY MEDICINE

## 2020-02-28 PROCEDURE — 3017F COLORECTAL CA SCREEN DOC REV: CPT | Performed by: FAMILY MEDICINE

## 2020-02-28 PROCEDURE — G8420 CALC BMI NORM PARAMETERS: HCPCS | Performed by: FAMILY MEDICINE

## 2020-02-28 PROCEDURE — 1036F TOBACCO NON-USER: CPT | Performed by: FAMILY MEDICINE

## 2020-02-28 ASSESSMENT — PATIENT HEALTH QUESTIONNAIRE - PHQ9
SUM OF ALL RESPONSES TO PHQ QUESTIONS 1-9: 0
1. LITTLE INTEREST OR PLEASURE IN DOING THINGS: 0
2. FEELING DOWN, DEPRESSED OR HOPELESS: 0
SUM OF ALL RESPONSES TO PHQ QUESTIONS 1-9: 0
SUM OF ALL RESPONSES TO PHQ9 QUESTIONS 1 & 2: 0

## 2020-02-28 ASSESSMENT — ENCOUNTER SYMPTOMS
RESPIRATORY NEGATIVE: 1
DIARRHEA: 1
ALLERGIC/IMMUNOLOGIC NEGATIVE: 1

## 2020-02-28 NOTE — PROGRESS NOTES
drugs.    Medications/Allergies/Immunizations:     Her current medication(s) include   Current Outpatient Medications:     NONFORMULARY, Take 1 tablet by mouth 4 times daily (before meals and nightly) Magtein by Now, magnesium at [facility] or [facility], Disp: , Rfl:     Menaquinone-7 (VITAMIN K2 PO), Take 1 capsule by mouth 2 times daily, Disp: , Rfl:     vitamin C (ASCORBIC ACID) 500 MG tablet, Take 1,000 mg by mouth 4 times daily, Disp: , Rfl:     Lysine 500 MG CAPS, Take 1 capsule by mouth 4 times daily, Disp: , Rfl:     Omega-3 Fatty Acids ([facility]S FISH OIL) 1000 MG CAPS, Take 3 capsules by mouth 4 times daily CVS instead of Josr Fish Oil, Disp: , Rfl:     Specialty Vitamins Products (MAGNESIUM, AMINO ACID CHELATE,) 133 MG tablet, Take 1 tablet by mouth 4 times daily , Disp: , Rfl:     DHEA 25 MG TABS, Take 50 mg by mouth every morning (before breakfast) , Disp: , Rfl:     B Complex-Folic Acid (I-731 BALANCED TR PO),  Take 1 tablet by mouth 3 times daily (before meals) , Disp: , Rfl:     Calcium 600-200 MG-UNIT TABS, Take 1 tablet by mouth 3 times daily. , Disp: , Rfl:     Calcium-Magnesium-Vitamin D 400-166.7-133.3 MG-MG-UNIT TABS, Take 1 tablet by mouth 2 times daily (before meals) , Disp: , Rfl:     Vitamin D (CHOLECALCIFEROL) 1000 UNITS CAPS capsule, Take 2,000 Units by mouth daily Skip Sundays, Disp: , Rfl:     Cinnamon 500 MG CAPS,  Take 3 capsules by mouth 4 times daily (before meals and nightly) , Disp: , Rfl:     Magnesium Oxide 250 MG TABS, Take 2 tablets by mouth 4 times daily (before meals and nightly) , Disp: , Rfl:     Multiple Vitamins-Minerals (CENTRUM PO), Take  by mouth. take 1 Tab by mouth daily. , Disp: , Rfl:     tazarotene (AVAGE) 0.1 % cream, Apply  topically. 1 Application by Topical route. use thin film, Disp: , Rfl:   Allergies: Latex; Azithromycin; Cephalexin; Levofloxacin; Parabens; Penicillins; Sulfa antibiotics;  Tetracycline; and Wheat bran,  Immunizations: Immunization History   Administered Date(s) Administered    Tdap (Boostrix, Adacel) 09/25/2016        History of PresentIllness: Diana's had concerns including 6 Month Follow-Up. Sandy Bailon  presents to the [address] [place] [address] today for;   Chief Complaint   Patient presents with    6 Month Follow-Up   , ,  abnormal labs follow up and these conditions as she  Is looking today for:     1. Malaise and fatigue    2. Snores    3. Elevated BP without diagnosis of hypertension    4. Disorder of lipid metabolism    5. Nonspecific immunological findings    6. Gastroesophageal reflux disease with esophagitis    7. Monocytosis (symptomatic)    8. Abnormal blood chemistry    9. Homocysteinemia ([address]    10. High triglycerides      HPI    Subjective:     Review of Systems   Constitutional: Negative. HENT: Negative. Respiratory: Negative. Cardiovascular: Negative. Gastrointestinal: Positive for diarrhea. Endocrine: Negative. Genitourinary: Negative. Musculoskeletal: Negative. Skin: Negative. Allergic/Immunologic: Negative. Neurological: Negative. Hematological: Negative. All other systems reviewed and are negative. Objective:     /84 (Site: Right Upper Arm, Position: Sitting, Cuff Size: Medium Adult)   Pulse 60   Temp 97.4 °F (36.3 °C) (Oral)   Resp 10   Ht 5' 8.27\" (1.734 m)   Wt 161 lb 3.2 oz (73.1 kg)   SpO2 99%   BMI 24.32 kg/m²   Physical Exam  Vitals signs and nursing note reviewed. Constitutional:       Appearance: Normal appearance. HENT:      Head: Normocephalic. Pulmonary:      Effort: Pulmonary effort is normal.   Neurological:      Mental Status: She is alert. Psychiatric:         Mood and Affect: Mood normal.         Thought Content:  Thought content normal.            Laboratory Data:   Lab results were searched in Care Everywhere and/or those brought by the pateint were reviewed today with Yobani Rey and she has a copy of their hepatotoxicity of cinnamon and other nutrients and suggest they frequently search nih.gov for the latest non-proprietary information on nutriceuticals as well as consider a subscription to Punch! fordetails on reviewed supplements, or at the least review the nutrient files at [address] at [facility], [employer], an insulin mimetic, reduces some High Carbohydrate Dietary Impacts. Methylhydroxychalcone polymers insulin-enhancing properties in fat cells are responsible for enhanced glucose uptake, inhibiting hepatic HMG-CoA reductase and lowers lipids. www.jacn. org/content/20/4/327.full     But cinnamon with additivessuch as Cinnamon Extract are not effective as insulin mimetics.  :eStoreDirectory.at     Nutrients for Start up from [employer] or [facility] for ease to get started now ;  Junior Holt has some useable products;  - Triple Strength Fish Oil, enteric coated  - Vit D 3 5000 IU gel caps  - Iron ferrous sulfat 325 mg tabs  - Centrum Silver look-a-like for most patients, or  - Centrum plain look-a-like if need iron    Localpharmacies or chains such as [facility], [facility], [facility], have;  - Triple Strength Fish Oil (enteric coated ifavailable) or    If not enteric coated, can take from freezer for less burps  - B-50 or B-100 released balanced B complex tabs  - Cinnamon bark 500 mg (without Chromium or extracts)   some brands list 1000 mg / serving of 2 capsules,    some brands have 1000 mg caps with the undesireable chromium / extract  - Calcium carbonate/citrate, magnesium oxide/citrate, Vit D 3  as 3-4 tabs/caps/serving     Some Local Brands may contain Zincwhich is acceptable for the first bottle or two  - Magnesium oxide 250 mg tabs for those having < 2 bowel movements daily  - Magnesium citrate 200 mg if having > 2bowel movement/day  - Centrum Silver or look-a-like for most patients, Centrum plain or look-a-like with iron  - Vitamin D-3 comes as 1,000 IU or 2,000 IU or 5,000 IU gel caps or Liquid drops      Some brands containing or derived from soy oil or corn oil are OK if not allergic to soy  - Elemental Iron 65 mg tabsat bedtime is available over the counter if need more iron     Usually turns bowel movements grey, green or black but not a concern  - Apricot Kernel Oil (by Now) for dry skin sensitive perineal or perianal area skin    Nutrients for ongoing use by Mail order for less expense from [employer] ;  - Strength Fish Oil , 240 Softgels Item #[id number]  -B-100 time released balanced B complex Item #[id number]  - Cinnamon bark 500 mg without Chromium or extract Item #178154  - Calcium carbonate 1000 mg, Magnesium oxide 500 mg, Vit D 3  400 IU Item #[id number]  - Magnesium oxide 500 mg tabs Item #711784 if less than 2 bowel movements daily  - [employer] Item #739191 for mostpatients, One Daily Item #722158 with iron  - Vit D 3  1,000 Item #[id number]      2,000 IU Item #[id number]  ,000 IU Item #249409     Some brands containing orderived from soy oil or corn oil are OK if not allergic to soy    Nutrients for Special Needs by Kam Diggs for less expense from www. puritan.com ;  -Elemental Iron 65 mg tabs Item #222165 if need more iron for low iron on labs    Usually turns bowel movements grey, green or black but not a concern  - Time released Niacin 250 mg Item #276099 for cold intolerance, low libido or impotence  - DHEA 50 mg Item #782753 for improving DHEA levels on labs if having Fatigue    If stools too loose substitute for your Magnesium oxide using;   Magnesium citrate 200 mg tabs(NOT liquid) at [employer]. [employer]   Magnesium gluconate 550 mgby [place] at [employer]. com or amazon. com  Magnesium chloride foot soaks or body sprays  www.ClearFlow   Magnesium chloride flakes 14.99 Item #: INU328 if Backordered get spray    Food Drink Symptom Log;  I asked this patient to track these items and any other symptoms on their list on a weekly LocallyGrown Produce  January, February, March: use canned, frozen or dried fruits since lower in latex  April; asparagus, radishes  May; asparagus, broccoli, green onions, greens, peas, radishes,rhubarb  June; asparagus, beets, beans, broccoli, cabbage, cantaloupe, carrots, green onions, greens, lettuce,onions, parsley, peas, radishes, rhubarb, strawberries, watermelons  July; beans, beets, blueberries,broccoli, cabbage, cantaloupe, carrots, cauliflower, celery, cucumbers, eggplant, grapes, green onions, greens, lettuce, onions, parsley, peas, peaches, bell peppers, potatoes, radishes, summer raspberries, squash, sweetcorn, tomatoes, turnips, watermelons  August; apples, beans, beets, blueberries, cabbage, cantaloupe, carrots,cauliflower, celery, cucumbers, eggplant, grapes, green onions, greens, lettuce, onions, parsley, peas, peaches, pears, bell peppers, potatoes, radishes, squash, sweet corn, tomatoes, turnips, watermelons  September; apples, beans, beets, blueberries, cabbage, cantaloupe, carrots, cauliflower, celery, cucumbers, eggplant, grapes,green onions, greens, lettuce, onions, parsley, peas, peaches, pears, bell peppers, plums, potatoes, pumpkins, radishes, fall red raspberries, squash, sweet corn, tomatoes, turnips, watermelons  October; apples, beets, broccoli, cabbage, carrots, cauliflower, celery, green onions, greens, lettuce, parsley, peas, pears, potatoes,pumpkins, radishes, fall red raspberries, squash, turnips  November; broccoli, cabbage, carrots, parsley,pears, peas  December: use canned, frozen ordried fruits since lower in latex    Upto half of latex-sensitive patients show allergic reactions to fruits (avocados, bananas, kiwifruits, papayas, peaches),   Annals of Allergy, 1994. These plants contain the same proteins that are allergens in latex.  People with fruit allergies should warn physicians beforeundergoing procedures which may cause anaphylactic reaction if in contact with latex

## 2022-06-13 LAB
HIV AG/AB: NEGATIVE
PAP SMEAR, EXTERNAL: NEGATIVE

## 2023-02-01 ENCOUNTER — OFFICE VISIT (OUTPATIENT)
Dept: FAMILY MEDICINE CLINIC | Age: 58
End: 2023-02-01
Payer: COMMERCIAL

## 2023-02-01 VITALS
BODY MASS INDEX: 24.77 KG/M2 | HEIGHT: 68 IN | DIASTOLIC BLOOD PRESSURE: 82 MMHG | TEMPERATURE: 98.1 F | RESPIRATION RATE: 12 BRPM | SYSTOLIC BLOOD PRESSURE: 126 MMHG | OXYGEN SATURATION: 99 % | WEIGHT: 163.4 LBS | HEART RATE: 72 BPM

## 2023-02-01 DIAGNOSIS — R79.9 ABNORMAL BLOOD CHEMISTRY: ICD-10-CM

## 2023-02-01 DIAGNOSIS — K21.00 GASTROESOPHAGEAL REFLUX DISEASE WITH ESOPHAGITIS WITHOUT HEMORRHAGE: ICD-10-CM

## 2023-02-01 DIAGNOSIS — R53.83 MALAISE AND FATIGUE: ICD-10-CM

## 2023-02-01 DIAGNOSIS — R89.4 NONSPECIFIC IMMUNOLOGICAL FINDINGS: ICD-10-CM

## 2023-02-01 DIAGNOSIS — E78.9 DISORDER OF LIPID METABOLISM: ICD-10-CM

## 2023-02-01 DIAGNOSIS — M23.204 OLD BUCKET HANDLE TEAR OF MEDIAL MENISCUS OF LEFT KNEE: ICD-10-CM

## 2023-02-01 DIAGNOSIS — S01.111S: ICD-10-CM

## 2023-02-01 DIAGNOSIS — L73.9 FOLLICULITIS: Primary | ICD-10-CM

## 2023-02-01 DIAGNOSIS — L57.0 ACTINIC KERATOSIS: ICD-10-CM

## 2023-02-01 DIAGNOSIS — R53.81 MALAISE AND FATIGUE: ICD-10-CM

## 2023-02-01 DIAGNOSIS — D72.821 MONOCYTOSIS (SYMPTOMATIC): ICD-10-CM

## 2023-02-01 DIAGNOSIS — R79.83 HOMOCYSTEINEMIA: ICD-10-CM

## 2023-02-01 PROBLEM — S01.111A RIGHT EYELID LACERATION: Status: ACTIVE | Noted: 2019-11-12

## 2023-02-01 PROBLEM — S83.242A TEAR OF MEDIAL MENISCUS OF LEFT KNEE: Status: ACTIVE | Noted: 2019-04-02

## 2023-02-01 PROCEDURE — 99215 OFFICE O/P EST HI 40 MIN: CPT | Performed by: FAMILY MEDICINE

## 2023-02-01 PROCEDURE — G8484 FLU IMMUNIZE NO ADMIN: HCPCS | Performed by: FAMILY MEDICINE

## 2023-02-01 PROCEDURE — 3017F COLORECTAL CA SCREEN DOC REV: CPT | Performed by: FAMILY MEDICINE

## 2023-02-01 PROCEDURE — 1036F TOBACCO NON-USER: CPT | Performed by: FAMILY MEDICINE

## 2023-02-01 PROCEDURE — G8420 CALC BMI NORM PARAMETERS: HCPCS | Performed by: FAMILY MEDICINE

## 2023-02-01 PROCEDURE — G8427 DOCREV CUR MEDS BY ELIG CLIN: HCPCS | Performed by: FAMILY MEDICINE

## 2023-02-01 SDOH — ECONOMIC STABILITY: INCOME INSECURITY: HOW HARD IS IT FOR YOU TO PAY FOR THE VERY BASICS LIKE FOOD, HOUSING, MEDICAL CARE, AND HEATING?: NOT VERY HARD

## 2023-02-01 SDOH — ECONOMIC STABILITY: HOUSING INSECURITY
IN THE LAST 12 MONTHS, WAS THERE A TIME WHEN YOU DID NOT HAVE A STEADY PLACE TO SLEEP OR SLEPT IN A SHELTER (INCLUDING NOW)?: NO

## 2023-02-01 SDOH — ECONOMIC STABILITY: FOOD INSECURITY: WITHIN THE PAST 12 MONTHS, YOU WORRIED THAT YOUR FOOD WOULD RUN OUT BEFORE YOU GOT MONEY TO BUY MORE.: NEVER TRUE

## 2023-02-01 SDOH — ECONOMIC STABILITY: FOOD INSECURITY: WITHIN THE PAST 12 MONTHS, THE FOOD YOU BOUGHT JUST DIDN'T LAST AND YOU DIDN'T HAVE MONEY TO GET MORE.: NEVER TRUE

## 2023-02-01 ASSESSMENT — PATIENT HEALTH QUESTIONNAIRE - PHQ9
2. FEELING DOWN, DEPRESSED OR HOPELESS: 0
SUM OF ALL RESPONSES TO PHQ QUESTIONS 1-9: 0
SUM OF ALL RESPONSES TO PHQ QUESTIONS 1-9: 0
SUM OF ALL RESPONSES TO PHQ9 QUESTIONS 1 & 2: 0
SUM OF ALL RESPONSES TO PHQ QUESTIONS 1-9: 0
1. LITTLE INTEREST OR PLEASURE IN DOING THINGS: 0
SUM OF ALL RESPONSES TO PHQ QUESTIONS 1-9: 0

## 2023-02-01 NOTE — PROGRESS NOTES
[id number] [facility] Rene INFANTE [address] [address]  Dept: [phone]  Dept Fax: [phone]  Loc: [address] [place] is a 61 y.o. White female. Oconnor Born  presents to the [place] [address] clinic today for   Chief Complaint   Patient presents with    Discuss Labs    Follow-up   , and;   1. Folliculitis    2. Right eyelid laceration, sequela    3. Old bucket handle tear of medial meniscus of left knee    4. Abnormal blood chemistry    5. Actinic keratosis    6. Disorder of lipid metabolism    7. Gastroesophageal reflux disease with esophagitis without hemorrhage    8. Homocysteinemia    9. Malaise and fatigue    10. Nonspecific immunological findings    11. Monocytosis (symptomatic)          I have reviewed Jose Germain medical, surgical and other pertinent history in detail, and have updated medication and allergy information in the computerized patient record. Clinical Care Team:     -Referring Provider for today's consult: self  -Primary Care Provider: Darrell Caba    Medical/Surgical History:   She  has a past medical history of AK (actinic keratosis), Fibromyalgia, Folliculitis, Gastritis, GERD (gastroesophageal reflux disease), IBS (irritable bowel syndrome), IgG Gliadin antibody positive, Lipids abnormal, Monocytosis, MVP (mitral valve prolapse), Psoriasis, and Thyroid disease. Her  has a past surgical history that includes Cholecystectomy and Breast lumpectomy. Family/Social History:     Her family history includes Alcohol Abuse in her brother and father; Arthritis in her father and sister; Coronary Art Dis in her father; Hypertension in her brother and father; Prostate Cancer in her father; Thyroid Disease in her sister. She  reports that she quit smoking about 18 years ago. Her smoking use included cigarettes. She has a 2.50 pack-year smoking history.  She has never used smokeless tobacco. She reports that she does not drink alcohol and does not use drugs.    Medications/Allergies/Immunizations:     Her current medication(s) include   Current Outpatient Medications:     Barberry-Oreg Grape-Goldenseal (BERBERINE COMPLEX PO), Take 1 capsule by mouth daily (before dinner) Amazing Formulas, Disp: , Rfl:     NONFORMULARY, Take 1 tablet by mouth 4 times daily (before meals and nightly) Magtein by Now, magnesium at [employer], Disp: , Rfl:     Menaquinone-7 (VITAMIN K2 PO), Take 1 capsule by mouth 2 times daily, Disp: , Rfl:     vitamin C (ASCORBIC ACID) 500 MG tablet, Take 1,000 mg by mouth 4 times daily, Disp: , Rfl:     Lysine 500 MG CAPS, Take 1 capsule by mouth 4 times daily, Disp: , Rfl:     Omega-3 Fatty Acids (CVS FISH OIL) 1000 MG CAPS, Take 3 capsules by mouth 4 times daily CVS instead of Josr Fish Oil, Disp: , Rfl:     Specialty Vitamins Products (MAGNESIUM, AMINO ACID CHELATE,) 133 MG tablet, Take 1 tablet by mouth 4 times daily , Disp: , Rfl:     DHEA 25 MG TABS, Take 50 mg by mouth every morning (before breakfast) , Disp: , Rfl:     B Complex-Folic Acid (B-100 BALANCED TR PO),  Take 1 tablet by mouth 3 times daily (before meals) , Disp: , Rfl:     Calcium 600-200 MG-UNIT TABS, Take 1 tablet by mouth 3 times daily., Disp: , Rfl:     Calcium-Magnesium-Vitamin D 400-166.7-133.3 MG-MG-UNIT TABS, Take 1 tablet by mouth 2 times daily (before meals) , Disp: , Rfl:     Vitamin D (CHOLECALCIFEROL) 1000 UNITS CAPS capsule, Take 2,000 Units by mouth daily Skip Sundays, Disp: , Rfl:     Cinnamon 500 MG CAPS,  Take 3 capsules by mouth 4 times daily (before meals and nightly) , Disp: , Rfl:     Magnesium Oxide 250 MG TABS, Take 2 tablets by mouth 4 times daily (before meals and nightly) , Disp: , Rfl:     Multiple Vitamins-Minerals (CENTRUM PO), Take  by mouth. take 1 Tab by mouth daily., Disp: , Rfl:     tazarotene (AVAGE) 0.1 % cream, Apply  topically. 1 Application  by Topical route. use thin film, Disp: , Rfl:   Allergies: Latex, Azithromycin, Cephalexin, Levofloxacin, Parabens, Penicillins, Sulfa antibiotics, Tetracycline, and Wheat bran  Immunizations:   Immunization History   Administered Date(s) Administered    Tdap (Boostrix, Adacel) 09/25/2016        History of Present Illness: Diana's had concerns including Discuss Labs and Follow-up. Maynor Quiroga  presents to the [address] [place] [address] today for;   Chief Complaint   Patient presents with    Discuss Labs    Follow-up   , abnormal labs follow up and these conditions as she  Is looking today for:     1. Folliculitis    2. Right eyelid laceration, sequela    3. Old bucket handle tear of medial meniscus of left knee    4. Abnormal blood chemistry    5. Actinic keratosis    6. Disorder of lipid metabolism    7. Gastroesophageal reflux disease with esophagitis without hemorrhage    8. Homocysteinemia    9. Malaise and fatigue    10. Nonspecific immunological findings    11. Monocytosis (symptomatic)      HPI    Subjective:     Review of Systems   All other systems reviewed and are negative. Objective:     /82 (Site: Left Upper Arm, Position: Sitting, Cuff Size: Medium Adult)   Pulse 72   Temp 98.1 °F (36.7 °C) (Temporal)   Resp 12   Ht 5' 8.27\" (1.734 m)   Wt 163 lb 6.4 oz (74.1 kg)   SpO2 99%   BMI 24.65 kg/m²   Physical Exam  Vitals and nursing note reviewed. Constitutional:       Appearance: Normal appearance. HENT:      Head: Normocephalic. Pulmonary:      Effort: Pulmonary effort is normal.   Neurological:      Mental Status: She is alert. Psychiatric:         Mood and Affect: Mood normal.         Thought Content:  Thought content normal.          Laboratory Data:   Lab results were searched in Care Everywhere and/or those brought by the pateint were reviewed today with Malik Chavez and she has a copy of their most recent labs to take home with them as noted below;       Imaging Data:   Imaging Data:       Assessment & Plan:       Impression:  1. Folliculitis    2. Right eyelid laceration, sequela    3. Old bucket handle tear of medial meniscus of left knee    4. Abnormal blood chemistry    5. Actinic keratosis    6. Disorder of lipid metabolism    7. Gastroesophageal reflux disease with esophagitis without hemorrhage    8. Homocysteinemia    9. Malaise and fatigue    10. Nonspecific immunological findings    11. Monocytosis (symptomatic)      Assessment and Plan:  After reviewing the patients chief complaints, reviewing their labfindings in great detail (with the patient and those accompanying them) which correlate to their chief complaints, symptoms, and or medical conditions; suggestions were made relating to changes in diet and or supplements which may improve the complaints and which will be reflected in their future lab findings; Chief Complaint   Patient presents with    Discuss Labs    Follow-up   ;    Plans for the next visits:  - Abnormal and non-optimal Labs were ordered today to be repeated in the next 120-365 days to assess changes from adjustments in nutrition and or nutrients. - Patient instructed when having a blood draw to ask the  to divide their lab draws into multiple draws over several days if not feeling good at the time of the lab draw or if either prefers to do several smaller blood draws over several days  -Patient instructed to check with insurer before each lab draw and to go to the lab which the insurer directs them for the most cost effective lab draw with the least patient's cost  - Alexx Burr  will be scheduled subsequent to those results. Michelle Urbanrakelvioleta will bring in her drink, food, supplement log to her next visit    Chronic Problems Addressed on this Visit:                                   1.  Intensity of Service; Uncontrolled items at this visit; Chief Complaint   Patient presents with    Discuss Labs    Follow-up   ;               Improved items at this visit and Stable items were discussed at this visit;  2. Patients food, drinks, supplements and symptoms were reviewed with the patient,       - Gilford Melena will bring food, drink, supplements and symptoms log to next visit for inclusion in their record      - 75 better food list reviewed & given to patient with the omega 6 food list to avoid      - The 52 Latex foods list was reviewed and given to the patients with the information on carrageenan         - Gluten in corn and oats abstracts sheet reviewed and given to the patient today   3. Greater than 40 minutes time was spent with the patient face to face on this visit; of which >50% was for counseling and coordination of care, as well as the time spent before and after the visit reviewing the chart, documenting the encounter, reviewing labs,reports, NIH listed studies, making phone calls, etc.      Patients food and drinks were reviewed with the patient,   - They will bring a food drink symptom log to future visits for inclusion in their record    - 75 better food list reviewed & given to patient along with the omega 6 food list to avoid      - Gluten in corn and oats abstracts sheet reviewed and given to the patient today    - 23 Foods containing Latex-like proteins was reviewed and copy to be taken if desired     - Nutrient Supplements list provided and copyto be taken if desired    - [employer]. [employer] web site offered to patient to review at their convenience by staff with login information    Note:  I have discussed with the patient that with all nutraceuticals, there is often mixed data and emerging research which needs to be monitored; as well as an array of NIH fact sheets on nutrients and supplements, available at www.nih,issue plus Z[employer]. [employer] plus www.lpi,org. If I have recommended cinnamon at the request of this patient to assist them in control of their blood sugar, triglyceride, and/or weight issues.   I discussed that the patient's clinical use of cinnamon bark, calcium, magnesium, Vitamin D, and pharmaceutical grade CVS omega 3 oil or triple-strength fish oil, and B-50/B-100 time-released B-complex by [address] [facility] will be for a time-limited trial to determine their individual effectiveness and safety in this patient. I also referred the patient to the NMCD: Nutrition, Metabolism, and Cardiovascular Diseases (SecuritiesCard.pl) and concerns about long-term use and hepatotoxicity of cinnamon and other nutrients. I suggested they frequently search nih.gov for the latest non-proprietary information on nutriceuticals as well as consider a subscription to Sensorist for details on reviewed supplements, or at the least review the nutrient files at [facility] at [facility], [address] bark, an insulin mimetic, reduces some High Carbohydrate Dietary Impacts. Methylhydroxychalcone polymers insulin-enhancing properties in fat cells are responsible for enhanced glucose uptake, inhibiting hepatic HMG-CoA reductase and lowers lipids. www.jacn. org/content/20/4/327.full     But cinnamon with additives such as Cinnamon Extract are not effective as insulin mimetics.  :eStoreDirectory.at     Nutrients for Start up from [employer] or [employer]en for ease to get started now;  Junior Holt has some useable products;  - Triple Strength Fish Oil, enteric coated  - Vit D-3 5000 IU gel caps  - Iron ferrous sulfate 325 mg tabs  - Centrum Silver look-a-like for most patients, or  - Centrum plain look-a-like if need iron    Local pharmacies or chains such as [employer], have:  - Telisma pharmaceutical grade omega 3 is 90% EPA/DHA whereas most Triple strength fish oil are 75% EPA/DHA  - Triple Strength Fish Oil (enteric coated if available) or if not enteric coated, can take from freezer for less burps  - B-50 or B-100 released balanced B complex tabs by [place] [facility] [place] bark 500 mg (without Chromium or extracts)   some brands list 1000 mg / serving of 2 capsules,    some brands have 1000 mg caps with the undesireable chromium extract  - Calcium carbonate/citrate, magnesium oxide/citrate, Vit D-3 as 3-4 tabs/caps/serving     Some Local Brands may contain Zinc which is acceptable for the first bottle or two  - Magnesium oxide 250 mg tabs for those having < 2 bowel movements daily  - Magnesium citrate 200 mg if having > 2 bowel movements/day  - Centrum Silver or look-a-like for most patients, Centrum plain or look-a-like with iron  - Vitamin D-3 comes as 1,000 IU or 2,000 IU or 5,000 IU gel caps or Liquid drops but keep Vitamin D levels <50 but >40     Some brands containing or derived from soy oil or corn oil are OK if not allergic to soy  - Elemental Iron 65 mg tabs at bedtime is available over the counter if need more iron     Usually turns bowel movements grey, green, or black but not a concern  - Apricot Kernel Oil (by Now) for dry skin sensitive perineal or perianal area skin    Nutrients for ongoing use by Mail order for less expense from [place] ;  - Strength Fish Oil , 240 Softgels Item #[id number]  -B-100 time released balanced B complex Item #[id number]  - Cinnamon bark 500 mg without Chromium or extract Item #893129  - Calcium carbonate 1000 mg, Magnesium oxide 500 mg, Vit D-3 400 IU Item #[id number]  - Magnesium oxide 500 mg tabs Item #809298 if less than 2 bowel movements daily  - [employer] Item #978868 for most patients, One Daily Item #153575 with iron  - Vit D 3  1,000 Item #[id number]      2,000 IU Item #[id number]   Item #601440     Some brands containing orderived from soy oil or corn oil are OK if not allergic to soy    Nutrients for Special Needs by Mail order for less expense from www. puritan.com;  -Elemental Iron 65 mg tabs Item #832941 if need more iron for low iron on labs    Usually turns bowel movements grey, green or black but not a concern  - Time released Niacin 250 mg Item #977002 for cold intolerance, low libido or impotence  - DHEA 50 mg Item #715185 for improving DHEA levels on labs if having Fatigue    If stools too loose substitute for your Magnesium oxide using;   Magnesium citrate 200 mg tabs (NOT liquid) at [employer]   Magnesium gluconate 550 mg by Janina Orozco at [employer] or K[employer]hu. com  Magnesium chloride foot soaks or body sprays  www.[employer]. Sports   Magnesium chloride flakes 14.99 Item #: [id number] if back-ordered, get spray  Magnesium threonate, Magtein also helps mental clarity and sleep    Food Drink Symptom Log;  I asked this patient to track these items and any other symptoms on their list on a weekly basis to documenttheir progress or lack of same. This can be done on the symptom tracking sheet I gave them at today's visit but looks like this:                                                      Rate on scale of 0-10 with zero = not noticeable  Symptom:                            Week 1               2                 3                 4               Etc            Hair loss    Foot cramps    Paresthesia    Aches    IBS (irritable bowel)    Constipation    Diarrhea  Nocturia (up to bathroom at night)    Fatigue/Energy level  Stress      On the other side of the sheet they can track their food, drink, environment, activity, symptoms etc      Avoiding Latex-like proteins in my foods; Avocados, Bananas, Celery, Figs & Kiwi proteins have latex-like proteins to inflame our immune systems, plus 47 more foods  How Can I Have A Latex Allergy? Eating foods with latex-like protein exposes us to latex allergies. Our body cannot tell the differencebetween these latex-like proteins and latex from rubber products since many people are allergic to fruit, vegetables and latex. Read labels on pre-packaged foods.  This list to avoid is only a guide if you are known allergicto latex or have a latex rash on your chin, cheeks and lines on your neck and chest. The amount of latex is different in each food product or fruit variety. Avoid out of Season if not grown locally:   Melon, Nectarine, Papaya, Cherry, Passion fruit, Plum, Chestnuts, and Tomato. Avocado, Banana, Celery, Figs, and Kiwi always contain Latex-like protein. Whats in Season? Strawberries taste better in June than December because June is strawberry season so buy locally grown produce \"in season\" for the best flavor, cost, and less Latex. Locally grown produce not only tastes great but also requires little or no ethylene exposure in food distribution so has less latex content. Out of season: use canned, frozen, or dried since those are processed ripe and latex content is lower!!!     Month     [place] Locally Grown Produce  January, February, March: use canned, frozen or dried fruits since lower in latex  April: asparagus, radishes  May: asparagus, broccoli, green onions, greens, peas, radishes, rhubarb  [place]: asparagus, beets, beans, broccoli, cabbage, cantaloupe, carrots, green onions, greens, lettuce, onions, parsley, peas, radishes, rhubarb, strawberries, watermelons  July: beans, beets, blueberries, broccoli, cabbage, cantaloupe, carrots, cauliflower, celery, cucumbers, eggplant, grapes, green onions, greens, lettuce, onions, parsley, peas, peaches, bell peppers, potatoes, radishes, summer raspberries, squash, sweetcorn, tomatoes, turnips, watermelons  August: apples, beans, beets, blueberries, cabbage, cantaloupe, carrots, cauliflower, celery, cucumbers, eggplant, grapes, green onions, greens, lettuce, onions, parsley, peas, peaches, pears, bell peppers, potatoes, radishes, squash, sweet corn, tomatoes, turnips, watermelons  September: apples, beans, beets, blueberries, cabbage, cantaloupe, carrots, cauliflower, celery, cucumbers, eggplant, grapes, green onions, greens, lettuce, onions, parsley, peas, peaches, pears, bell peppers, plums, potatoes, pumpkins, radishes, fall red raspberries, squash, sweet corn, tomatoes, turnips, watermelons  October: apples, beets, broccoli, cabbage, carrots, cauliflower, celery, green onions, greens, lettuce, parsley, peas, pears, potatoes, pumpkins, radishes, fall red raspberries, squash, turnips  November: broccoli, cabbage, carrots, parsley, pears, peas  December: use canned, frozen or dried fruits since lower in latex    Upto half of latex-sensitive patients show allergic reactions to fruits (avocados, bananas, kiwifruits, papayas, peaches),   Annals of Allergy, 1994. These plants contain the same proteins that are allergens in latex. People with fruit allergies should warn physicians before undergoing procedures which may cause anaphylactic reaction if in contact with latex gloves. Some of the common foods with defined cross-reactivity to latex are avocado, banana, kiwi, chestnut, raw potato, tomato, stone fruits (e.g., peach, cherry), hazelnut, melons, celery, carrot, apple, pear, papaya, and almond. Foods with less well-defined cross-reactivity to latex are peanuts, peppers, citrus fruits, coconut, pineapple, duane, fig, passion fruit, Ugli fruit, and grape. This fruit/latex cross-reactivity is worsened by ethylene, a gas used to hasten commercial ripening. In nature, plants produce low levels of the hormone ethylene, which regulates germination, flowering, and ripening. Forced ripening by high ethylene concentrations, plants produce allergenic wound-repair proteins, which are similar to wound-repair proteins made during the tapping of rubber trees. Sensitive individuals who ingest the fruit get a higher dose and worse reaction. Some people may even first become sensitized to latex through fruit. Can food processing increase the concentrations of allergenic proteins? Latex-sensitized children (and adults) in [place] often experience allergic reactions after eating bananas ripened artificially with ethylene.  In the [facility], food distribution centers treat unripe bananas and other produce with ethylene to ripen; not commonly done in [facility] since fruit is tree-ripened there. Does treatment of food with ethylene induce banana proteins that cross-react with latex? (Mehrdad et al.)    References:   Latex in Foods Allergy, http://ehp.niehs.nih.gov/members/2003/5811/5811.html    Search web for [employer] in Season \" for where you live or are at the time you food shop   Management of Latex, ://medicalcenter. [facility].edu/  search for nih, latex-like proteins in foods

## 2023-02-01 NOTE — PATIENT INSTRUCTIONS
Thank you   Thank you for trusting us with your healthcare needs. You may receive a survey regarding today's visit. It would help us out if you would take a few moments to provide your feedback. We value your input. Please bring in ALL medications BOTTLES, including inhalers, herbal supplements, over the counter, prescribed & non-prescribed medicine. The office would like actual medication bottles and a list.   Please note our OFFICE POLICIES:   Prior to getting your labs drawn, please check with your insurance company for benefits and eligibility of lab services. Often, insurance companies cover certain tests for preventative visits only. It is patient's responsibility to see what is covered. We are unable to change a diagnosis after the test has been performed. Lab orders will not be re-printed. Please hold onto your original lab orders and take them to your lab to be completed. If you no show your scheduled appointment three times, you will be dismissed from this practice. Reschedules must be completed 24 hours prior to your schedule appointment. If the list below has been completed, PLEASE FAX RECORDS TO OUR OFFICE @ [phone].  Once the records have been received we will update your records at our office:  Health Maintenance Due   Topic Date Due    COVID-19 Vaccine (1) Never done    Depression Screen  Never done    HIV screen  Never done    Shingles vaccine (1 of 2) Never done    Breast cancer screen  08/31/2021    Flu vaccine (1) Never done

## 2023-04-01 LAB
AVERAGE GLUCOSE: 165
BUN BLDV-MCNC: 16 MG/DL
CALCIUM SERPL-MCNC: 9.1 MG/DL
CHLORIDE BLD-SCNC: 106 MMOL/L
CHOLESTEROL, TOTAL: 178 MG/DL
CHOLESTEROL/HDL RATIO: 2.5
CO2: 29 MMOL/L
CREAT SERPL-MCNC: 0.94 MG/DL
EGFR: NORMAL
GLUCOSE BLD-MCNC: 111 MG/DL
HBA1C MFR BLD: 7.5 %
HDLC SERPL-MCNC: 72 MG/DL (ref 35–70)
LDL CHOLESTEROL CALCULATED: 99 MG/DL (ref 0–160)
NONHDLC SERPL-MCNC: ABNORMAL MG/DL
POTASSIUM SERPL-SCNC: 3.9 MMOL/L
SODIUM BLD-SCNC: 139 MMOL/L
TRIGL SERPL-MCNC: 33 MG/DL
VLDLC SERPL CALC-MCNC: 7 MG/DL

## 2023-04-04 DIAGNOSIS — K21.00 GASTROESOPHAGEAL REFLUX DISEASE WITH ESOPHAGITIS WITHOUT HEMORRHAGE: ICD-10-CM

## 2023-04-04 DIAGNOSIS — R79.9 ABNORMAL BLOOD CHEMISTRY: ICD-10-CM

## 2023-04-04 DIAGNOSIS — R89.4 NONSPECIFIC IMMUNOLOGICAL FINDINGS: ICD-10-CM

## 2023-04-04 DIAGNOSIS — E78.9 DISORDER OF LIPID METABOLISM: ICD-10-CM

## 2023-04-04 DIAGNOSIS — D72.821 MONOCYTOSIS (SYMPTOMATIC): ICD-10-CM

## 2023-04-04 DIAGNOSIS — M23.204 OLD BUCKET HANDLE TEAR OF MEDIAL MENISCUS OF LEFT KNEE: ICD-10-CM

## 2023-04-04 DIAGNOSIS — S01.111S: ICD-10-CM

## 2023-04-04 DIAGNOSIS — L57.0 ACTINIC KERATOSIS: ICD-10-CM

## 2023-04-04 DIAGNOSIS — R53.83 MALAISE AND FATIGUE: ICD-10-CM

## 2023-04-04 DIAGNOSIS — R53.81 MALAISE AND FATIGUE: ICD-10-CM

## 2023-04-04 DIAGNOSIS — L73.9 FOLLICULITIS: ICD-10-CM

## 2023-04-04 DIAGNOSIS — R79.83 HOMOCYSTEINEMIA: ICD-10-CM

## 2023-04-06 DIAGNOSIS — R89.4 NONSPECIFIC IMMUNOLOGICAL FINDINGS: ICD-10-CM

## 2023-04-06 DIAGNOSIS — L57.0 ACTINIC KERATOSIS: ICD-10-CM

## 2023-04-06 DIAGNOSIS — K21.00 GASTROESOPHAGEAL REFLUX DISEASE WITH ESOPHAGITIS WITHOUT HEMORRHAGE: ICD-10-CM

## 2023-04-06 DIAGNOSIS — R53.83 MALAISE AND FATIGUE: ICD-10-CM

## 2023-04-06 DIAGNOSIS — R53.81 MALAISE AND FATIGUE: ICD-10-CM

## 2023-04-06 DIAGNOSIS — L73.9 FOLLICULITIS: ICD-10-CM

## 2023-04-06 DIAGNOSIS — S01.111S: ICD-10-CM

## 2023-04-06 DIAGNOSIS — R79.83 HOMOCYSTEINEMIA: ICD-10-CM

## 2023-04-06 DIAGNOSIS — M23.204 OLD BUCKET HANDLE TEAR OF MEDIAL MENISCUS OF LEFT KNEE: ICD-10-CM

## 2023-04-06 DIAGNOSIS — D72.821 MONOCYTOSIS (SYMPTOMATIC): ICD-10-CM

## 2023-04-06 DIAGNOSIS — R79.9 ABNORMAL BLOOD CHEMISTRY: ICD-10-CM

## 2023-04-06 DIAGNOSIS — E78.9 DISORDER OF LIPID METABOLISM: ICD-10-CM

## 2023-04-17 ENCOUNTER — OFFICE VISIT (OUTPATIENT)
Dept: FAMILY MEDICINE CLINIC | Age: 58
End: 2023-04-17
Payer: COMMERCIAL

## 2023-04-17 VITALS
DIASTOLIC BLOOD PRESSURE: 82 MMHG | HEART RATE: 67 BPM | TEMPERATURE: 97.4 F | SYSTOLIC BLOOD PRESSURE: 134 MMHG | WEIGHT: 162 LBS | RESPIRATION RATE: 10 BRPM | OXYGEN SATURATION: 99 % | BODY MASS INDEX: 24.55 KG/M2 | HEIGHT: 68 IN

## 2023-04-17 DIAGNOSIS — M23.204 OLD BUCKET HANDLE TEAR OF MEDIAL MENISCUS OF LEFT KNEE: ICD-10-CM

## 2023-04-17 DIAGNOSIS — D72.821 MONOCYTOSIS (SYMPTOMATIC): ICD-10-CM

## 2023-04-17 DIAGNOSIS — L57.0 ACTINIC KERATOSIS: ICD-10-CM

## 2023-04-17 DIAGNOSIS — S01.111S: ICD-10-CM

## 2023-04-17 DIAGNOSIS — L73.9 FOLLICULITIS: Primary | ICD-10-CM

## 2023-04-17 DIAGNOSIS — R79.83 HOMOCYSTEINEMIA: ICD-10-CM

## 2023-04-17 DIAGNOSIS — E78.9 DISORDER OF LIPID METABOLISM: ICD-10-CM

## 2023-04-17 DIAGNOSIS — K21.00 GASTROESOPHAGEAL REFLUX DISEASE WITH ESOPHAGITIS WITHOUT HEMORRHAGE: ICD-10-CM

## 2023-04-17 DIAGNOSIS — R53.83 MALAISE AND FATIGUE: ICD-10-CM

## 2023-04-17 DIAGNOSIS — R79.9 ABNORMAL BLOOD CHEMISTRY: ICD-10-CM

## 2023-04-17 DIAGNOSIS — R53.81 MALAISE AND FATIGUE: ICD-10-CM

## 2023-04-17 DIAGNOSIS — R89.4 NONSPECIFIC IMMUNOLOGICAL FINDINGS: ICD-10-CM

## 2023-04-17 PROCEDURE — 99215 OFFICE O/P EST HI 40 MIN: CPT | Performed by: FAMILY MEDICINE

## 2023-04-17 PROCEDURE — G8420 CALC BMI NORM PARAMETERS: HCPCS | Performed by: FAMILY MEDICINE

## 2023-04-17 PROCEDURE — 3017F COLORECTAL CA SCREEN DOC REV: CPT | Performed by: FAMILY MEDICINE

## 2023-04-17 PROCEDURE — 1036F TOBACCO NON-USER: CPT | Performed by: FAMILY MEDICINE

## 2023-04-17 PROCEDURE — G8427 DOCREV CUR MEDS BY ELIG CLIN: HCPCS | Performed by: FAMILY MEDICINE

## 2023-04-17 RX ORDER — CALCIUM CARBONATE 260MG(650)
1 TABLET,CHEWABLE ORAL
COMMUNITY

## 2023-04-17 RX ORDER — PHENOL 1.4 %
1 AEROSOL, SPRAY (ML) MUCOUS MEMBRANE 3 TIMES DAILY
COMMUNITY

## 2023-04-17 NOTE — PATIENT INSTRUCTIONS
Thank you   Thank you for trusting us with your healthcare needs. You may receive a survey regarding today's visit. It would help us out if you would take a few moments to provide your feedback. We value your input. Please bring in ALL medications BOTTLES, including inhalers, herbal supplements, over the counter, prescribed & non-prescribed medicine. The office would like actual medication bottles and a list.   Please note our OFFICE POLICIES:   Prior to getting your labs drawn, please check with your insurance company for benefits and eligibility of lab services. Often, insurance companies cover certain tests for preventative visits only. It is patient's responsibility to see what is covered. We are unable to change a diagnosis after the test has been performed. Lab orders will not be re-printed. Please hold onto your original lab orders and take them to your lab to be completed. If you no show your scheduled appointment three times, you will be dismissed from this practice. Reschedules must be completed 24 hours prior to your schedule appointment. If the list below has been completed, PLEASE FAX RECORDS TO OUR OFFICE @ [phone].  Once the records have been received we will update your records at our office:  Health Maintenance Due   Topic Date Due    COVID-19 Vaccine (1) Never done    HIV screen  Never done    Cervical cancer screen  Never done    Shingles vaccine (1 of 2) Never done    Breast cancer screen  08/31/2021

## 2023-05-30 LAB — MAMMOGRAPHY, EXTERNAL: NEGATIVE

## 2023-09-06 LAB
AVERAGE GLUCOSE: 102
CHOLESTEROL, TOTAL: 159 MG/DL
CHOLESTEROL/HDL RATIO: 2.3
HBA1C MFR BLD: 5.4 %
HDLC SERPL-MCNC: 70 MG/DL (ref 35–70)
LDL CHOLESTEROL CALCULATED: 81 MG/DL (ref 0–160)
NONHDLC SERPL-MCNC: NORMAL MG/DL
TRIGL SERPL-MCNC: 38 MG/DL
VLDLC SERPL CALC-MCNC: 8 MG/DL

## 2023-09-11 ENCOUNTER — OFFICE VISIT (OUTPATIENT)
Dept: FAMILY MEDICINE CLINIC | Age: 58
End: 2023-09-11
Payer: COMMERCIAL

## 2023-09-11 VITALS
BODY MASS INDEX: 24.13 KG/M2 | WEIGHT: 159.2 LBS | SYSTOLIC BLOOD PRESSURE: 130 MMHG | TEMPERATURE: 97.7 F | DIASTOLIC BLOOD PRESSURE: 86 MMHG | OXYGEN SATURATION: 99 % | HEIGHT: 68 IN | RESPIRATION RATE: 10 BRPM | HEART RATE: 63 BPM

## 2023-09-11 DIAGNOSIS — D72.821 MONOCYTOSIS (SYMPTOMATIC): ICD-10-CM

## 2023-09-11 DIAGNOSIS — R89.4 NONSPECIFIC IMMUNOLOGICAL FINDINGS: ICD-10-CM

## 2023-09-11 DIAGNOSIS — L73.9 FOLLICULITIS: Primary | ICD-10-CM

## 2023-09-11 DIAGNOSIS — L57.0 ACTINIC KERATOSIS: ICD-10-CM

## 2023-09-11 DIAGNOSIS — S01.111S: ICD-10-CM

## 2023-09-11 DIAGNOSIS — R53.83 MALAISE AND FATIGUE: ICD-10-CM

## 2023-09-11 DIAGNOSIS — M23.204 OLD BUCKET HANDLE TEAR OF MEDIAL MENISCUS OF LEFT KNEE: ICD-10-CM

## 2023-09-11 DIAGNOSIS — R79.83 HOMOCYSTEINEMIA: ICD-10-CM

## 2023-09-11 DIAGNOSIS — R53.81 MALAISE AND FATIGUE: ICD-10-CM

## 2023-09-11 DIAGNOSIS — R79.9 ABNORMAL BLOOD CHEMISTRY: ICD-10-CM

## 2023-09-11 DIAGNOSIS — K21.00 GASTROESOPHAGEAL REFLUX DISEASE WITH ESOPHAGITIS WITHOUT HEMORRHAGE: ICD-10-CM

## 2023-09-11 DIAGNOSIS — E78.9 DISORDER OF LIPID METABOLISM: ICD-10-CM

## 2023-09-11 PROCEDURE — 3017F COLORECTAL CA SCREEN DOC REV: CPT | Performed by: FAMILY MEDICINE

## 2023-09-11 PROCEDURE — G8420 CALC BMI NORM PARAMETERS: HCPCS | Performed by: FAMILY MEDICINE

## 2023-09-11 PROCEDURE — 1036F TOBACCO NON-USER: CPT | Performed by: FAMILY MEDICINE

## 2023-09-11 PROCEDURE — 99215 OFFICE O/P EST HI 40 MIN: CPT | Performed by: FAMILY MEDICINE

## 2023-09-11 PROCEDURE — G8427 DOCREV CUR MEDS BY ELIG CLIN: HCPCS | Performed by: FAMILY MEDICINE

## 2023-09-11 RX ORDER — BERBERINE CHLOR/SEAWEED/CHROM 500-250 MG
1 CAPSULE ORAL
COMMUNITY

## 2023-09-11 RX ORDER — DESOXIMETASONE 2.5 MG/G
CREAM TOPICAL
COMMUNITY
Start: 2023-08-10

## 2023-09-11 RX ORDER — TRETINOIN 0.5 MG/G
CREAM TOPICAL NIGHTLY
COMMUNITY

## 2023-09-11 NOTE — PROGRESS NOTES
[address] [facility] [address] [place] [address]  Dept: [phone]  Dept Fax: [phone]  Loc: Maggie Barkley is a 60 y.o. White female. Marcel Red  presents to the [address] [place] [address] today for   Chief Complaint   Patient presents with    [facility]   , and;   1. Folliculitis    2. Right eyelid laceration, sequela    3. Old bucket handle tear of medial meniscus of left knee    4. Abnormal blood chemistry    5. Actinic keratosis    6. Disorder of lipid metabolism    7. Gastroesophageal reflux disease with esophagitis without hemorrhage    8. Homocysteinemia    9. Malaise and fatigue    10. Nonspecific immunological findings    11. Monocytosis (symptomatic)          I have reviewed [facility] medical, surgical and other pertinent history in detail, and have updated medication and allergy information in the computerized patient record. Clinical Care Team:     -Referring Provider for today's consult: self  -Primary Care Provider: Rich Page    Medical/Surgical History:   She  has a past medical history of AK (actinic keratosis), Fibromyalgia, Folliculitis, Gastritis, GERD (gastroesophageal reflux disease), IBS (irritable bowel syndrome), IgG Gliadin antibody positive, Lipids abnormal, Monocytosis, MVP (mitral valve prolapse), Psoriasis, and Thyroid disease. Her  has a past surgical history that includes Cholecystectomy and Breast lumpectomy. Family/Social History:     Her family history includes Alcohol Abuse in her brother and father; Arthritis in her father and sister; Coronary Art Dis in her father; Hypertension in her brother and father; Prostate Cancer in her father; Thyroid Disease in her sister. She  reports that she quit smoking about 18 years ago. Her smoking use included cigarettes. She has a 2.50 pack-year smoking history.  She has

## 2024-05-10 LAB
CHOLESTEROL, TOTAL: 209 MG/DL
CHOLESTEROL/HDL RATIO: ABNORMAL
HDLC SERPL-MCNC: 81 MG/DL (ref 35–70)
LDL CHOLESTEROL CALCULATED: 121 MG/DL (ref 0–160)
NONHDLC SERPL-MCNC: ABNORMAL MG/DL
TRIGL SERPL-MCNC: 35 MG/DL
VLDLC SERPL CALC-MCNC: 7 MG/DL

## 2024-05-11 PROBLEM — M85.88 OSTEOPENIA OF LUMBAR SPINE: Status: ACTIVE | Noted: 2024-05-11

## 2024-05-11 NOTE — PROGRESS NOTES
[facility]  [address]  [facility] [address]  Dept: [phone]  Dept Fax: [phone]  Loc: [phone]      Diana Germain is a 58 y.o. White female. Diana  presents to the [facility] clinic today for No chief complaint on file.  , and;   No diagnosis found.      I have reviewed Diana Germain medical, surgical and other pertinent history in detail, and have updated medication and allergy information in the computerized patient record.     Clinical Care Team:     -Referring Provider for today's consult: self  -Primary Care Provider: Undetermined, Awaiting Assignment (Inactive)    Medical/Surgical History:   She  has a past medical history of AK (actinic keratosis), Fibromyalgia, Folliculitis, Gastritis, GERD (gastroesophageal reflux disease), IBS (irritable bowel syndrome), IgG Gliadin antibody positive, Lipids abnormal, Monocytosis, MVP (mitral valve prolapse), Psoriasis, and Thyroid disease.  Her  has a past surgical history that includes Cholecystectomy and Breast lumpectomy.    Family/Social History:     Her family history includes Alcohol Abuse in her brother and father; Arthritis in her father and sister; Coronary Art Dis in her father; Hypertension in her brother and father; Prostate Cancer in her father; Thyroid Disease in her sister.  She  reports that she quit smoking about 19 years ago. Her smoking use included cigarettes. She started smoking about 29 years ago. She has a 2.5 pack-year smoking history. She has never used smokeless tobacco. She reports that she does not drink alcohol and does not use drugs.    Medications/Allergies/Immunizations:     Her current medication(s) include   Current Outpatient Medications:     desoximetasone (TOPICORT) 0.25 % cream, , Disp: , Rfl:     tretinoin (RETIN-A) 0.05 % cream, Apply topically nightly, Disp: , Rfl:     Berberine Chloride (BERBERINE HCI) 500 MG

## 2024-05-13 ENCOUNTER — OFFICE VISIT (OUTPATIENT)
Dept: FAMILY MEDICINE CLINIC | Age: 59
End: 2024-05-13
Payer: COMMERCIAL

## 2024-05-13 VITALS
DIASTOLIC BLOOD PRESSURE: 88 MMHG | TEMPERATURE: 98.7 F | SYSTOLIC BLOOD PRESSURE: 134 MMHG | WEIGHT: 173.4 LBS | HEIGHT: 68 IN | BODY MASS INDEX: 26.28 KG/M2 | OXYGEN SATURATION: 97 % | RESPIRATION RATE: 12 BRPM | HEART RATE: 64 BPM

## 2024-05-13 DIAGNOSIS — M85.88 OSTEOPENIA OF LUMBAR SPINE: Primary | ICD-10-CM

## 2024-05-13 PROCEDURE — 3017F COLORECTAL CA SCREEN DOC REV: CPT | Performed by: FAMILY MEDICINE

## 2024-05-13 PROCEDURE — G8427 DOCREV CUR MEDS BY ELIG CLIN: HCPCS | Performed by: FAMILY MEDICINE

## 2024-05-13 PROCEDURE — 1036F TOBACCO NON-USER: CPT | Performed by: FAMILY MEDICINE

## 2024-05-13 PROCEDURE — G8419 CALC BMI OUT NRM PARAM NOF/U: HCPCS | Performed by: FAMILY MEDICINE

## 2024-05-13 PROCEDURE — 99213 OFFICE O/P EST LOW 20 MIN: CPT | Performed by: FAMILY MEDICINE

## 2024-05-13 RX ORDER — CHLORHEXIDINE GLUCONATE ORAL RINSE 1.2 MG/ML
15 SOLUTION DENTAL 2 TIMES DAILY
Qty: 420 ML | Refills: 0 | Status: SHIPPED | OUTPATIENT
Start: 2024-05-13 | End: 2024-05-27

## 2024-05-13 RX ORDER — DOXYCYCLINE HYCLATE 100 MG
100 TABLET ORAL 2 TIMES DAILY
Qty: 14 TABLET | Refills: 0 | Status: SHIPPED | OUTPATIENT
Start: 2024-05-13 | End: 2024-05-20

## 2024-05-13 SDOH — ECONOMIC STABILITY: FOOD INSECURITY: WITHIN THE PAST 12 MONTHS, YOU WORRIED THAT YOUR FOOD WOULD RUN OUT BEFORE YOU GOT MONEY TO BUY MORE.: NEVER TRUE

## 2024-05-13 SDOH — ECONOMIC STABILITY: FOOD INSECURITY: WITHIN THE PAST 12 MONTHS, THE FOOD YOU BOUGHT JUST DIDN'T LAST AND YOU DIDN'T HAVE MONEY TO GET MORE.: NEVER TRUE

## 2024-05-13 SDOH — ECONOMIC STABILITY: INCOME INSECURITY: HOW HARD IS IT FOR YOU TO PAY FOR THE VERY BASICS LIKE FOOD, HOUSING, MEDICAL CARE, AND HEATING?: NOT HARD AT ALL

## 2024-05-13 ASSESSMENT — PATIENT HEALTH QUESTIONNAIRE - PHQ9
SUM OF ALL RESPONSES TO PHQ9 QUESTIONS 1 & 2: 0
SUM OF ALL RESPONSES TO PHQ QUESTIONS 1-9: 0
SUM OF ALL RESPONSES TO PHQ QUESTIONS 1-9: 0
2. FEELING DOWN, DEPRESSED OR HOPELESS: NOT AT ALL
SUM OF ALL RESPONSES TO PHQ QUESTIONS 1-9: 0
1. LITTLE INTEREST OR PLEASURE IN DOING THINGS: NOT AT ALL
SUM OF ALL RESPONSES TO PHQ QUESTIONS 1-9: 0

## 2024-05-29 ENCOUNTER — TELEPHONE (OUTPATIENT)
Dept: FAMILY MEDICINE CLINIC | Age: 59
End: 2024-05-29

## 2024-05-29 NOTE — TELEPHONE ENCOUNTER
Pt called and lm asking about supplement changes, and not sure how to get back into her my chart account.    I called her back, got voice mail.  Lm that she should review the letter slowly and increase or decrease or keep dosages the same.      Also left the my chart help desk number for her to call and get things reset: [phone].

## 2024-06-05 ENCOUNTER — TELEMEDICINE (OUTPATIENT)
Dept: FAMILY MEDICINE CLINIC | Age: 59
End: 2024-06-05
Payer: COMMERCIAL

## 2024-06-05 DIAGNOSIS — R79.9 ABNORMAL BLOOD CHEMISTRY: ICD-10-CM

## 2024-06-05 DIAGNOSIS — M85.88 OSTEOPENIA OF LUMBAR SPINE: ICD-10-CM

## 2024-06-05 DIAGNOSIS — R89.4 NONSPECIFIC IMMUNOLOGICAL FINDINGS: ICD-10-CM

## 2024-06-05 DIAGNOSIS — N60.09 CYST OF BREAST, UNSPECIFIED LATERALITY: ICD-10-CM

## 2024-06-05 DIAGNOSIS — D72.821 MONOCYTOSIS (SYMPTOMATIC): ICD-10-CM

## 2024-06-05 DIAGNOSIS — R53.81 MALAISE AND FATIGUE: ICD-10-CM

## 2024-06-05 DIAGNOSIS — R53.83 MALAISE AND FATIGUE: ICD-10-CM

## 2024-06-05 DIAGNOSIS — L73.9 FOLLICULITIS: ICD-10-CM

## 2024-06-05 PROCEDURE — 1036F TOBACCO NON-USER: CPT | Performed by: FAMILY MEDICINE

## 2024-06-05 PROCEDURE — 99215 OFFICE O/P EST HI 40 MIN: CPT | Performed by: FAMILY MEDICINE

## 2024-06-05 PROCEDURE — 3017F COLORECTAL CA SCREEN DOC REV: CPT | Performed by: FAMILY MEDICINE

## 2024-06-05 PROCEDURE — G8419 CALC BMI OUT NRM PARAM NOF/U: HCPCS | Performed by: FAMILY MEDICINE

## 2024-06-05 PROCEDURE — G8428 CUR MEDS NOT DOCUMENT: HCPCS | Performed by: FAMILY MEDICINE

## 2024-06-05 RX ORDER — MULTIVIT WITH MINERALS/LUTEIN
1000 TABLET ORAL DAILY
COMMUNITY

## 2024-06-05 NOTE — PROGRESS NOTES
[facility]  [address]  [facility] [address]  Dept: [phone]  Dept Fax: [phone]  Loc: [phone]      Diana Germain is a 59 y.o. White female. Diana  presents to the [facility] clinic today Diana Germain, was evaluated through a synchronous (real-time) audio-video encounter. The patient (or guardian if applicable) is aware that this is a billable service, which includes applicable co-pays. This Virtual Visit was conducted with patient's (and/or legal guardian's) consent. Patient identification was verified, and a caregiver was present when appropriate. The patient was in their home which was located in a state where the provider was located in his office at [facility] practice in [facility] and licensed to provide care.  for   Chief Complaint   Patient presents with    Discuss Labs   , and;   1. Folliculitis    2. Osteopenia of lumbar spine    3. Abnormal blood chemistry    4. Malaise and fatigue    5. Cyst of breast, unspecified laterality    6. Nonspecific immunological findings    7. Monocytosis (symptomatic)          I have reviewed Diana Germain medical, surgical and other pertinent history in detail, and have updated medication and allergy information in the computerized patient record.     Clinical Care Team:     -Referring Provider for today's consult: self  -Primary Care Provider: Undetermined, Awaiting Assignment (Inactive)    Medical/Surgical History:   She  has a past medical history of AK (actinic keratosis), Fibromyalgia, Folliculitis, Gastritis, GERD (gastroesophageal reflux disease), IBS (irritable bowel syndrome), IgG Gliadin antibody positive, Lipids abnormal, Monocytosis, MVP (mitral valve prolapse), Psoriasis, and Thyroid disease.  Her  has a past surgical history that includes Cholecystectomy and Breast lumpectomy.    Family/Social History:

## 2024-06-10 ENCOUNTER — TELEPHONE (OUTPATIENT)
Dept: FAMILY MEDICINE CLINIC | Age: 59
End: 2024-06-10

## 2024-06-10 NOTE — TELEPHONE ENCOUNTER
Pt called and lm curious where her lab order are?  I reviewed chart.  She had a video visit appt on the 5th, which means they went out on the 6 th and in the mail.  Told her to wait until Wednesday and then if not received I could fax, but its not urgent.  She said for her it is, retiring on the 28 th and wants to make sure they are done on the current insurance.

## 2025-03-12 ENCOUNTER — TELEPHONE (OUTPATIENT)
Dept: FAMILY MEDICINE CLINIC | Age: 60
End: 2025-03-12

## 2025-03-12 DIAGNOSIS — E78.9 DISORDER OF LIPID METABOLISM: ICD-10-CM

## 2025-03-12 DIAGNOSIS — L57.0 ACTINIC KERATOSIS: ICD-10-CM

## 2025-03-12 DIAGNOSIS — S01.111D RIGHT EYELID LACERATION, SUBSEQUENT ENCOUNTER: ICD-10-CM

## 2025-03-12 DIAGNOSIS — R79.83 HOMOCYSTEINEMIA: ICD-10-CM

## 2025-03-12 DIAGNOSIS — R79.9 ABNORMAL BLOOD CHEMISTRY: ICD-10-CM

## 2025-03-12 DIAGNOSIS — R53.83 MALAISE AND FATIGUE: ICD-10-CM

## 2025-03-12 DIAGNOSIS — L73.9 FOLLICULITIS: ICD-10-CM

## 2025-03-12 DIAGNOSIS — K21.9 GASTROESOPHAGEAL REFLUX DISEASE WITHOUT ESOPHAGITIS: ICD-10-CM

## 2025-03-12 DIAGNOSIS — D72.821 MONOCYTOSIS (SYMPTOMATIC): ICD-10-CM

## 2025-03-12 DIAGNOSIS — M23.204 OTHER OLD TEAR OF MEDIAL MENISCUS OF LEFT KNEE: ICD-10-CM

## 2025-03-12 DIAGNOSIS — R89.4 NONSPECIFIC IMMUNOLOGICAL FINDINGS: ICD-10-CM

## 2025-03-12 DIAGNOSIS — M85.88 OSTEOPENIA OF LUMBAR SPINE: ICD-10-CM

## 2025-03-12 DIAGNOSIS — R53.81 MALAISE AND FATIGUE: ICD-10-CM

## 2025-03-12 NOTE — TELEPHONE ENCOUNTER
Pt called and lm.  Said she can only do a visit with Dr Zapata 1 x per year and she lost her lab orders.    I called her back, advised that she can stop looking for lab orders.  He hasn't issued any since last visit.  I asked what symptoms or persistent issues does she have?  Said she wants all the ones he usually orders, and please move the appt back to June or so.  If the appt is too soon the insurance won't cover.    I moved it back to June 23/2025 @ 1 pm.    Orders pended, please review, add correct diagnosis, approve or deny.    List:  cbc with diff, lipid, A1c, dhea-s, dhea, estradiol, progesterone,vitamin d, crp, testosterone free, Antigliadin Abs, IgG, tissue trans gluaminase iga, igg, homocysteine, tino, mag, pth, tpo,

## 2025-04-22 ENCOUNTER — TELEPHONE (OUTPATIENT)
Dept: ADMINISTRATIVE | Age: 60
End: 2025-04-22

## 2025-05-22 LAB
CHOLESTEROL, TOTAL: 222 MG/DL
CHOLESTEROL/HDL RATIO: ABNORMAL
ESTIMATED AVERAGE GLUCOSE: NORMAL
HBA1C MFR BLD: 5.6 %
HDLC SERPL-MCNC: 84 MG/DL (ref 35–70)
LDL CHOLESTEROL: 133
NONHDLC SERPL-MCNC: ABNORMAL MG/DL
TRIGL SERPL-MCNC: 31 MG/DL
VLDLC SERPL CALC-MCNC: 5 MG/DL

## 2025-05-24 ENCOUNTER — RESULTS FOLLOW-UP (OUTPATIENT)
Age: 60
End: 2025-05-24

## 2025-07-03 ENCOUNTER — OFFICE VISIT (OUTPATIENT)
Age: 60
End: 2025-07-03
Payer: COMMERCIAL

## 2025-07-03 VITALS
RESPIRATION RATE: 12 BRPM | WEIGHT: 141 LBS | HEART RATE: 61 BPM | DIASTOLIC BLOOD PRESSURE: 70 MMHG | BODY MASS INDEX: 24.07 KG/M2 | SYSTOLIC BLOOD PRESSURE: 128 MMHG | HEIGHT: 64 IN | TEMPERATURE: 97.7 F | OXYGEN SATURATION: 95 %

## 2025-07-03 DIAGNOSIS — R89.4 NONSPECIFIC IMMUNOLOGICAL FINDINGS: ICD-10-CM

## 2025-07-03 DIAGNOSIS — R79.83 HOMOCYSTEINEMIA: ICD-10-CM

## 2025-07-03 DIAGNOSIS — D72.821 MONOCYTOSIS (SYMPTOMATIC): ICD-10-CM

## 2025-07-03 DIAGNOSIS — L73.9 FOLLICULITIS: ICD-10-CM

## 2025-07-03 DIAGNOSIS — L57.0 ACTINIC KERATOSIS: ICD-10-CM

## 2025-07-03 DIAGNOSIS — M85.88 OSTEOPENIA OF LUMBAR SPINE: ICD-10-CM

## 2025-07-03 DIAGNOSIS — S01.111D RIGHT EYELID LACERATION, SUBSEQUENT ENCOUNTER: ICD-10-CM

## 2025-07-03 DIAGNOSIS — R79.9 ABNORMAL BLOOD CHEMISTRY: Primary | ICD-10-CM

## 2025-07-03 DIAGNOSIS — K21.00 GASTROESOPHAGEAL REFLUX DISEASE WITH ESOPHAGITIS, UNSPECIFIED WHETHER HEMORRHAGE: ICD-10-CM

## 2025-07-03 DIAGNOSIS — R53.83 MALAISE AND FATIGUE: ICD-10-CM

## 2025-07-03 DIAGNOSIS — S83.212D BUCKET HANDLE TEAR OF MEDIAL MENISCUS OF LEFT KNEE, UNSPECIFIED WHETHER OLD OR CURRENT TEAR, SUBSEQUENT ENCOUNTER: ICD-10-CM

## 2025-07-03 DIAGNOSIS — R53.81 MALAISE AND FATIGUE: ICD-10-CM

## 2025-07-03 DIAGNOSIS — E78.9 DISORDER OF LIPID METABOLISM: ICD-10-CM

## 2025-07-03 PROCEDURE — 99215 OFFICE O/P EST HI 40 MIN: CPT | Performed by: FAMILY MEDICINE
